# Patient Record
Sex: FEMALE
[De-identification: names, ages, dates, MRNs, and addresses within clinical notes are randomized per-mention and may not be internally consistent; named-entity substitution may affect disease eponyms.]

---

## 2024-04-02 ENCOUNTER — NURSE TRIAGE (OUTPATIENT)
Dept: OTHER | Facility: CLINIC | Age: 29
End: 2024-04-02

## 2024-04-02 NOTE — TELEPHONE ENCOUNTER
Location of patient: [place]    Received call from Amrbosio at [facility] with Red Flag Complaint.    Subjective: Caller states needs new PCP, not feeling well, past few weeks husb and coworkers ill, missed period, took test last night + pregnancy.   Has been having mild symptoms, cramps off and on,achy,nausea     Current Symptoms: mild nausea     Onset: for past few weeks     Associated Symptoms: NA    Pain Severity: Denies     Temperature: Denies    What has been tried:     LMP:  2/2/2024  Pregnant: Yes edc 11/8/24 9 weeks pregnant    Recommended disposition: See in Office Within 2 Weeks    Care advice provided, patient verbalizes understanding; denies any other questions or concerns; instructed to call back for any new or worsening symptoms.    Patient/Caller agrees with recommended disposition; writer provided warm transfer to Iqra at [facility] for appointment scheduling    Attention Provider:  Thank you for allowing me to participate in the care of your patient.  The patient was connected to triage in response to information provided to the [facility].  Please do not respond through this encounter as the response is not directed to a shared pool.       Reason for Disposition   Pregnant    Protocols used: Menstrual Period - Missed or Late-ADULT-[place]

## 2024-04-12 ENCOUNTER — HOSPITAL ENCOUNTER (OUTPATIENT)
Age: 29
Setting detail: SPECIMEN
Discharge: HOME | End: 2024-04-12

## 2024-04-12 ENCOUNTER — OFFICE VISIT (OUTPATIENT)
Dept: FAMILY MEDICINE CLINIC | Age: 29
End: 2024-04-12

## 2024-04-12 VITALS
DIASTOLIC BLOOD PRESSURE: 74 MMHG | HEIGHT: 64 IN | SYSTOLIC BLOOD PRESSURE: 112 MMHG | TEMPERATURE: 97.9 F | RESPIRATION RATE: 16 BRPM | BODY MASS INDEX: 31.41 KG/M2 | WEIGHT: 184 LBS | OXYGEN SATURATION: 99 % | HEART RATE: 96 BPM

## 2024-04-12 DIAGNOSIS — D50.9 IRON DEFICIENCY ANEMIA, UNSPECIFIED IRON DEFICIENCY ANEMIA TYPE: ICD-10-CM

## 2024-04-12 DIAGNOSIS — Z32.00 POSSIBLE PREGNANCY: ICD-10-CM

## 2024-04-12 DIAGNOSIS — Z00.00 PREVENTATIVE HEALTH CARE: Primary | ICD-10-CM

## 2024-04-12 DIAGNOSIS — Z00.00 PREVENTATIVE HEALTH CARE: ICD-10-CM

## 2024-04-12 LAB
ALBUMIN SERPL-MCNC: 4.3 G/DL (ref 3.5–5.2)
ALBUMIN/GLOB SERPL: 1 {RATIO} (ref 1–2.5)
ALP SERPL-CCNC: 80 U/L (ref 35–104)
ALT SERPL-CCNC: 12 U/L (ref 10–35)
ANION GAP SERPL CALCULATED.3IONS-SCNC: 13 MMOL/L (ref 9–16)
AST SERPL-CCNC: 18 U/L (ref 10–35)
BILIRUB SERPL-MCNC: 0.2 MG/DL (ref 0–1.2)
BUN SERPL-MCNC: 9 MG/DL (ref 6–20)
CALCIUM SERPL-MCNC: 9.5 MG/DL (ref 8.6–10.4)
CHLORIDE SERPL-SCNC: 102 MMOL/L (ref 98–107)
CHOLEST SERPL-MCNC: 137 MG/DL (ref 0–199)
CHOLESTEROL/HDL RATIO: 2
CO2 SERPL-SCNC: 21 MMOL/L (ref 20–31)
CREAT SERPL-MCNC: 0.7 MG/DL (ref 0.5–0.9)
EST. AVERAGE GLUCOSE BLD GHB EST-MCNC: 94 MG/DL
GFR SERPL CREATININE-BSD FRML MDRD: >90 ML/MIN/1.73M2
GLUCOSE SERPL-MCNC: 77 MG/DL (ref 74–99)
HBA1C MFR BLD: 4.9 % (ref 4–6)
HDLC SERPL-MCNC: 63 MG/DL
IRON SATN MFR SERPL: 11 % (ref 20–55)
IRON SERPL-MCNC: 39 UG/DL (ref 37–145)
LDLC SERPL CALC-MCNC: 61 MG/DL (ref 0–100)
POTASSIUM SERPL-SCNC: 4.2 MMOL/L (ref 3.7–5.3)
PROT SERPL-MCNC: 7.5 G/DL (ref 6.6–8.7)
SODIUM SERPL-SCNC: 136 MMOL/L (ref 136–145)
TIBC SERPL-MCNC: 361 UG/DL (ref 250–450)
TRIGL SERPL-MCNC: 67 MG/DL
TSH SERPL DL<=0.05 MIU/L-ACNC: 1.43 UIU/ML (ref 0.27–4.2)
UNSATURATED IRON BINDING CAPACITY: 322 UG/DL (ref 112–347)
VLDLC SERPL CALC-MCNC: 13 MG/DL

## 2024-04-12 SDOH — ECONOMIC STABILITY: FOOD INSECURITY: WITHIN THE PAST 12 MONTHS, THE FOOD YOU BOUGHT JUST DIDN'T LAST AND YOU DIDN'T HAVE MONEY TO GET MORE.: NEVER TRUE

## 2024-04-12 SDOH — ECONOMIC STABILITY: HOUSING INSECURITY
IN THE LAST 12 MONTHS, WAS THERE A TIME WHEN YOU DID NOT HAVE A STEADY PLACE TO SLEEP OR SLEPT IN A SHELTER (INCLUDING NOW)?: NO

## 2024-04-12 SDOH — ECONOMIC STABILITY: FOOD INSECURITY: WITHIN THE PAST 12 MONTHS, YOU WORRIED THAT YOUR FOOD WOULD RUN OUT BEFORE YOU GOT MONEY TO BUY MORE.: NEVER TRUE

## 2024-04-12 SDOH — ECONOMIC STABILITY: INCOME INSECURITY: HOW HARD IS IT FOR YOU TO PAY FOR THE VERY BASICS LIKE FOOD, HOUSING, MEDICAL CARE, AND HEATING?: NOT HARD AT ALL

## 2024-04-12 ASSESSMENT — ENCOUNTER SYMPTOMS
COUGH: 0
CHEST TIGHTNESS: 0
SINUS PRESSURE: 0
ABDOMINAL DISTENTION: 0
SORE THROAT: 0
COLOR CHANGE: 0
ABDOMINAL PAIN: 0
SINUS PAIN: 0
NAUSEA: 1
DIARRHEA: 0
CONSTIPATION: 0
RHINORRHEA: 1
SHORTNESS OF BREATH: 0
BACK PAIN: 0

## 2024-04-12 ASSESSMENT — PATIENT HEALTH QUESTIONNAIRE - PHQ9
SUM OF ALL RESPONSES TO PHQ QUESTIONS 1-9: 1
SUM OF ALL RESPONSES TO PHQ QUESTIONS 1-9: 1
SUM OF ALL RESPONSES TO PHQ9 QUESTIONS 1 & 2: 1
SUM OF ALL RESPONSES TO PHQ QUESTIONS 1-9: 1
SUM OF ALL RESPONSES TO PHQ QUESTIONS 1-9: 1
2. FEELING DOWN, DEPRESSED OR HOPELESS: SEVERAL DAYS
1. LITTLE INTEREST OR PLEASURE IN DOING THINGS: NOT AT ALL

## 2024-04-12 NOTE — PROGRESS NOTES
Avelina Bonilla, APRN-CNP  [facility]  [address], [address]  [facility] [address]  Dept: [phone]  Dept Fax: [phone]     Patient ID: Mildred Parekh is a 28 y.o. female.    HPI    Mildred Parekh is a 28 y.o. female New patient who presents to the office today for a first visit and to establish a relationship with a new primary care provider.  Previous PCP: [facility] in  last seen: 2-3 years    Today, the patient needs to establish care and missed period and took pregnancy test April 1st and it was positive.     LMP- feb 2nd     Preventative care, female:  Last PAP: [facility]  Nicotine use: never  Alcohol use: not currently  Drug use: never  Dental exam: 2 years ago   Eye exam: 1 year ago, glasses     Specialists:  none    Previous office notes, labs, imaging and hospital records were reviewed prior to and during encounter.    The patient's past medical, surgical, social, and family history as well as her current medications and allergies were reviewed as documented in today's encounter by ERASMO García.      No current outpatient medications on file prior to visit.     No current facility-administered medications on file prior to visit.       Subjective:     Review of Systems   Constitutional:  Positive for fatigue. Negative for activity change, chills and fever.   HENT:  Positive for congestion and rhinorrhea. Negative for ear pain, sinus pressure, sinus pain and sore throat.    Respiratory:  Negative for cough, chest tightness and shortness of breath.    Cardiovascular:  Negative for chest pain and palpitations.   Gastrointestinal:  Positive for nausea. Negative for abdominal distention, abdominal pain, constipation and diarrhea.   Endocrine: Negative for polydipsia, polyphagia and polyuria.   Genitourinary:  Negative for difficulty urinating, dysuria, frequency and urgency.   Musculoskeletal:  Negative for arthralgias, back pain and

## 2024-04-12 NOTE — PATIENT INSTRUCTIONS
Health Maintenance Recommendations  Exercise   I generally recommend that people of all ages try to get 150 minutes of physical activity per week and it doesn’t matter how this totals up, in other words 30 minutes 5 days per week is as good as 50 minutes 3 days a week and so on.    The level of activity should be such that it is able to get your heart rate up to 100 or more, for example a brisk walk should achieve this rate.   Dietary Recommendations  In terms of diet, I generally recommend trying to eat a healthy well balanced diet full of fruits and vegetables. Avoid carbonated drinks and fruit juices and limit your alcohol use.   Avoid processed foods wherever possible (anything that comes in a can or a box) which can be achieved by sticking to the outside walls of the grocery store where generally you will find fresh fruits/vegetables, meats, dairy, and frozen foods.    Try to avoid starches in the diet where possible and minimize bread, rice, potatoes, and pasta in the diet.  Specifically try to avoid gluten, which even in people that don’t have a timmy allergy, causes havoc in the small intestine and alters absorption of nutrients which can in turn lead to obesity.   Sleep  Try to achieve a regular sleep schedule, waking and laying down at the same time each night.  Most people need 7 hours per night plus or minus 2 hours.    You will know that you’re getting enough because you will wake feeling refreshed and not need to sleep in to catch up on weekends.   Skin Care  Make sure that you don’t neglect your skin.    Play it safe in the sun. Use a sunblock on all of your exposed skin.   The sunblock should be broad spectrum and water resistant.    I do recommend an SPF 30 or higher sun screen any time that you plan to be in the sun for more than 20 minutes, even in the winter or on cloudy days (keep in mind that UV light penetrates clouds and can cause burns even on cloudy days).   Apply 20 to 30 minutes before

## 2024-04-13 LAB
ERYTHROCYTE [DISTWIDTH] IN BLOOD BY AUTOMATED COUNT: 12.7 % (ref 11.8–14.4)
HCT VFR BLD AUTO: 37.9 % (ref 36.3–47.1)
HGB BLD-MCNC: 12.4 G/DL (ref 11.9–15.1)
MCH RBC QN AUTO: 29 PG (ref 25.2–33.5)
MCHC RBC AUTO-ENTMCNC: 32.7 G/DL (ref 28.4–34.8)
MCV RBC AUTO: 88.8 FL (ref 82.6–102.9)
NRBC BLD-RTO: 0 PER 100 WBC
PLATELET # BLD AUTO: 290 K/UL (ref 138–453)
PMV BLD AUTO: 11.6 FL (ref 8.1–13.5)
RBC # BLD AUTO: 4.27 M/UL (ref 3.95–5.11)
WBC OTHER # BLD: 9.3 K/UL (ref 3.5–11.3)

## 2024-04-15 ENCOUNTER — TELEPHONE (OUTPATIENT)
Dept: OBGYN CLINIC | Age: 29
End: 2024-04-15

## 2024-04-15 NOTE — TELEPHONE ENCOUNTER
Mildred Parekh calling reporting a positive pregnancy test.    Mildred Parekh is  a new patient.   If no: previous OBGYN n/a  This is  Mildred Parekh's first pregnancy.    Mildred Parekh last menstrual cycle: 2/2/24  Based on LMP patient is 10weeks     Dose patient have any concerns?   []YES  [x]NO  If yes, please discuss concern with provider to determine if patient needs additional appointment.      Scheduling Instructions and Education  []If patient less than 8 weeks please schedule missed menses (30 mins) between 6-8 weeks. ALSO scheduled USN w/ IPV (w/ NURSE) to follow 2-3 weeks after missed menses   Patient education: Initial missed menses appointment will consist of a pregnancy test and to establish care and review previous births **if applicable**. There will NOT be an ultrasound at this first visit. Please review that the USN and IPV visit will be 2-3 weeks after patient's missed menses. At this appointment dating ultrasound will be complete, prenatal labs ordered, prenatal education completed, pelvic exam if indicated   [x]If patient and GREATER than 8 weeks please schedule USN w/ missed menses (30 min) to follow and IPV (30 minutes) 2-4 weeks after (w/ NURSE)  Patient education: Dating USN will be completed prior to missed menses appointment. Missed menses appointment will be to establish care and past medical/surgical/obgyn history. Prenatal labs will be ordered. IPV that is scheduled 3-4 weeks after missed menses will consist of prenatal education/exam/pelvic if indicated  []If patient if believed to be greater than 12 weeks, please consult provider prior to scheduling   Please document appointments scheduled during phone call   Missed menses date/provider: 4/18/24  USN/IPV date:    Please forward telephone encounter to provider appointments are scheduled with prior to closing telephone encounter.

## 2024-04-18 ENCOUNTER — OFFICE VISIT (OUTPATIENT)
Dept: OBGYN CLINIC | Age: 29
End: 2024-04-18
Payer: COMMERCIAL

## 2024-04-18 ENCOUNTER — HOSPITAL ENCOUNTER (OUTPATIENT)
Age: 29
Setting detail: SPECIMEN
Discharge: HOME | End: 2024-04-18

## 2024-04-18 VITALS
DIASTOLIC BLOOD PRESSURE: 80 MMHG | WEIGHT: 181 LBS | HEIGHT: 64 IN | BODY MASS INDEX: 30.9 KG/M2 | SYSTOLIC BLOOD PRESSURE: 132 MMHG

## 2024-04-18 DIAGNOSIS — N92.6 MISSED MENSES: Primary | ICD-10-CM

## 2024-04-18 DIAGNOSIS — N92.6 MISSED MENSES: ICD-10-CM

## 2024-04-18 LAB
AMPHET UR QL SCN: NEGATIVE
BARBITURATES UR QL SCN: NEGATIVE
BENZODIAZ UR QL: NEGATIVE
CANNABINOIDS UR QL SCN: NEGATIVE
COCAINE UR QL SCN: NEGATIVE
FENTANYL UR QL: NEGATIVE
METHADONE UR QL: NEGATIVE
OPIATES UR QL SCN: NEGATIVE
OXYCODONE UR QL SCN: NEGATIVE
PCP UR QL SCN: NEGATIVE
TEST INFORMATION: NORMAL

## 2024-04-18 PROCEDURE — 99203 OFFICE O/P NEW LOW 30 MIN: CPT | Performed by: ADVANCED PRACTICE MIDWIFE

## 2024-04-18 RX ORDER — PNV NO.118/IRON FUMARATE/FA 29 MG-1 MG
TABLET,CHEWABLE ORAL
COMMUNITY

## 2024-04-18 NOTE — PROGRESS NOTES
[facility] [address]  [address]  [facility] [address]  Dept: [phone]    Missed Menses Intake    Subjective:    Patient Name:Mildred Parekh  Patient Age: 28 y.o.  Date of Visit: 2024    Mildred Parekh arrives for missed menses visit.   Mildred Parekh had a positive pregnancy test 2024  Mildred Parekh does not have concerns.   Planned Pregnancy: No  Partner/FOB name: Zachery-    No LMP recorded. Patient is pregnant., Regular menses: Yes  Estimate gestation age of LMP: 10w6d  Estimated due date based on LMP: 2024  Patient Occupation:     OBGYN History:   Date of Last Pap Smear: few years ago normal   Number of Pregnancies: 1  Number of Live Births: 0  [] Vaginal Birth  []  Birth  [] Vaginal Birth after  delivery ()  [] History of High Risk Pregnancy(ies)  [] History of Birth Complications  [] Hx of infant with NICU stay    Past Medical History  Diabetes: No  Anxiety: Yes  Depression: Yes  Bipolar: No  High Blood Pressure:No  Stroke: No  Seizure: No  Deep Vein Thrombosis: No  Preeclampsia: No  Gestational Diabetes:No  Gestational Hypertension:No  Postpartum Hemorrhage: No  Bleeding Disorder: No  Sexually Transmitted Infections: No  Genital Herpes: No  History of chicken pox/varicella vaccine: Yes  Daily Medications: No  N/a     Past Family History (first degree relative)  Family history of blood clots: No  Family history of diabetes:No  Family history of factor V: No  Family history (mother/sister) of preeclampsia in a previous pregnancy: No    Early 1 Hour Glucose Screening  [x] BMI 30 or greater (if marked, positive screen)  Risk Factors (need more than 1 if BMI less than 30)  [] Physical inactivity   [] First degree relative with diabetes  [] High- risk race or ethnicity (, , ,  American, )  [] Have previously given birth to an

## 2024-04-18 NOTE — PROGRESS NOTES
[facility] [address] [address]  Dept: [phone]    Patient Name: Mildred Parekh  Patient Age: 28 y.o.  Date of Visit: 2024    SUBJECTIVE:    Chief Complaint:  Chief Complaint   Patient presents with    Amenorrhea       HPI:    Mildred  is being seen today for missed menses.  This  is not a planned pregnancy.  She is  accepting at this time.  LMP: Patient's last menstrual period was 2024 (exact date). Sure and definite: Yes    28 day cycle: Yes    She was not on a contraceptive at the time of conception.  Estimated weeks gestation today : 24   Tentative KATHIE: 10w6d    Relationship with FOB: Zachery    Patient reports concerns: no    OB History          1    Para        Term                AB        Living             SAB        IAB        Ectopic        Molar        Multiple        Live Births                  Past Medical History:   Diagnosis Date    Anemia      Current Outpatient Medications   Medication Sig Dispense Refill    Prenatal Vit-Fe Fumarate-FA (PRENATAL VITAMIN) CHEW Take by mouth       No current facility-administered medications for this visit.         OBJECTIVE:    /80 (Site: Right Upper Arm, Position: Sitting, Cuff Size: Medium Adult)   Ht 1.626 m (5' 4\")   Wt 82.1 kg (181 lb)   LMP 2024 (Exact Date)   Breastfeeding No   BMI 31.07 kg/m²     She is complaining today of:   Pain: No  Cramping: No  Bleeding or spotting: No    Nausea: No  Vomiting: No    Breast enlargement/tenderness: No  Fatigue: No  Frequency of urination: No    Previous high risk obstetrical history: n/a  OB History    Para Term  AB Living   1             SAB IAB Ectopic Molar Multiple Live Births                    # Outcome Date GA Lbr Inderjit/2nd Weight Sex Delivery Anes PTL Lv   1                 History was reviewed as documented on [facility]

## 2024-04-19 LAB
CHLAMYDIA DNA UR QL NAA+PROBE: NEGATIVE
N GONORRHOEA DNA UR QL NAA+PROBE: NEGATIVE
SPECIMEN DESCRIPTION: NORMAL

## 2024-04-25 ENCOUNTER — INITIAL PRENATAL (OUTPATIENT)
Dept: OBGYN CLINIC | Age: 29
End: 2024-04-25

## 2024-04-25 ENCOUNTER — HOSPITAL ENCOUNTER (OUTPATIENT)
Age: 29
Setting detail: SPECIMEN
Discharge: HOME | End: 2024-04-25

## 2024-04-25 VITALS
BODY MASS INDEX: 30.93 KG/M2 | SYSTOLIC BLOOD PRESSURE: 112 MMHG | HEART RATE: 86 BPM | DIASTOLIC BLOOD PRESSURE: 80 MMHG | WEIGHT: 180.2 LBS

## 2024-04-25 DIAGNOSIS — Z3A.11 11 WEEKS GESTATION OF PREGNANCY: Primary | ICD-10-CM

## 2024-04-25 DIAGNOSIS — Z3A.11 11 WEEKS GESTATION OF PREGNANCY: ICD-10-CM

## 2024-04-25 PROCEDURE — 0500F INITIAL PRENATAL CARE VISIT: CPT | Performed by: ADVANCED PRACTICE MIDWIFE

## 2024-04-25 RX ORDER — ACETAMINOPHEN 500 MG
500 TABLET ORAL EVERY 6 HOURS PRN
COMMUNITY

## 2024-04-25 RX ORDER — ASPIRIN 81 MG/1
81 TABLET ORAL DAILY
Qty: 90 TABLET | Refills: 1 | Status: SHIPPED | OUTPATIENT
Start: 2024-04-25

## 2024-04-25 RX ORDER — CALCIUM CARBONATE 500 MG/1
1 TABLET, CHEWABLE ORAL DAILY
COMMUNITY

## 2024-04-25 SDOH — ECONOMIC STABILITY: INCOME INSECURITY: IN THE LAST 12 MONTHS, WAS THERE A TIME WHEN YOU WERE NOT ABLE TO PAY THE MORTGAGE OR RENT ON TIME?: NO

## 2024-04-25 SDOH — ECONOMIC STABILITY: FOOD INSECURITY: WITHIN THE PAST 12 MONTHS, YOU WORRIED THAT YOUR FOOD WOULD RUN OUT BEFORE YOU GOT MONEY TO BUY MORE.: NEVER TRUE

## 2024-04-25 SDOH — ECONOMIC STABILITY: HOUSING INSECURITY: IN THE LAST 12 MONTHS, HOW MANY PLACES HAVE YOU LIVED?: 1

## 2024-04-25 SDOH — ECONOMIC STABILITY: FOOD INSECURITY: WITHIN THE PAST 12 MONTHS, THE FOOD YOU BOUGHT JUST DIDN'T LAST AND YOU DIDN'T HAVE MONEY TO GET MORE.: NEVER TRUE

## 2024-04-25 SDOH — ECONOMIC STABILITY: TRANSPORTATION INSECURITY
IN THE PAST 12 MONTHS, HAS LACK OF TRANSPORTATION KEPT YOU FROM MEETINGS, WORK, OR FROM GETTING THINGS NEEDED FOR DAILY LIVING?: NO

## 2024-04-25 SDOH — ECONOMIC STABILITY: TRANSPORTATION INSECURITY
IN THE PAST 12 MONTHS, HAS THE LACK OF TRANSPORTATION KEPT YOU FROM MEDICAL APPOINTMENTS OR FROM GETTING MEDICATIONS?: NO

## 2024-04-25 ASSESSMENT — ANXIETY QUESTIONNAIRES
5. BEING SO RESTLESS THAT IT IS HARD TO SIT STILL: SEVERAL DAYS
7. FEELING AFRAID AS IF SOMETHING AWFUL MIGHT HAPPEN: SEVERAL DAYS
2. NOT BEING ABLE TO STOP OR CONTROL WORRYING: SEVERAL DAYS
IF YOU CHECKED OFF ANY PROBLEMS ON THIS QUESTIONNAIRE, HOW DIFFICULT HAVE THESE PROBLEMS MADE IT FOR YOU TO DO YOUR WORK, TAKE CARE OF THINGS AT HOME, OR GET ALONG WITH OTHER PEOPLE: SOMEWHAT DIFFICULT
4. TROUBLE RELAXING: NOT AT ALL
GAD7 TOTAL SCORE: 5
1. FEELING NERVOUS, ANXIOUS, OR ON EDGE: SEVERAL DAYS
6. BECOMING EASILY ANNOYED OR IRRITABLE: NOT AT ALL
3. WORRYING TOO MUCH ABOUT DIFFERENT THINGS: SEVERAL DAYS

## 2024-04-25 ASSESSMENT — SOCIAL DETERMINANTS OF HEALTH (SDOH)
WITHIN THE LAST YEAR, HAVE YOU BEEN AFRAID OF YOUR PARTNER OR EX-PARTNER?: NO
WITHIN THE LAST YEAR, HAVE TO BEEN RAPED OR FORCED TO HAVE ANY KIND OF SEXUAL ACTIVITY BY YOUR PARTNER OR EX-PARTNER?: NO
WITHIN THE LAST YEAR, HAVE YOU BEEN HUMILIATED OR EMOTIONALLY ABUSED IN OTHER WAYS BY YOUR PARTNER OR EX-PARTNER?: NO
WITHIN THE LAST YEAR, HAVE YOU BEEN KICKED, HIT, SLAPPED, OR OTHERWISE PHYSICALLY HURT BY YOUR PARTNER OR EX-PARTNER?: NO
HOW HARD IS IT FOR YOU TO PAY FOR THE VERY BASICS LIKE FOOD, HOUSING, MEDICAL CARE, AND HEATING?: NOT HARD AT ALL

## 2024-04-25 NOTE — PROGRESS NOTES
Infection History    Blood Type      Patient or partner has Hepatitis C No     Live with Someone with or Exposed to TB? No     History of STD/GC/Chlamydia/HPV/Syphilis? No     Patient or Partner has Hx of Genital Herpes? No     History of Chickenpox Yes     History of MRSA No     Risk of Toxoplasmosis Yes CATS    Risk of CMV No     List of other infections      Rash or Viral Illness Since LMP? No     Additional comments      Patient or partner has Hepatitis B No         Previous Birth History:   Vaginal Birth: no   Birth: no  Any previous birth complications: no  History of hemorrhage during/after birth: no  History of bleeding disorders: Yes-ANEMIC?    High Risk Factor Screening for this Pregnancy:  Age greater than 35 at delivery: No  History of  delivery: no  History of bleeding disorders: No  Objection to receiving blood products: No  History of diabetes (gestational or outside of pregnancy): No, On medications: No  Screening for pregestational diabetes:   BMI greater than 30: Yes. If Yes, need early glucola  BMI greater than 25 ( Americans greater than 23): Yes If Yes, need 1 more risk factor.   Physical inactivity: No  First-degree relative with diabetes: Yes-PATERNAL GPA  High-risk race of ethnicity (eg, , , ,  American, ): No  Previously given birth to an infant weighing 7lks60jc (4000g) or more: No  History of hypertension: No  HDL < 35mg/dL and/or a triglyceride level greater than 250 mg/dL: NA  History of polycystic ovarian syndrome: No  A1c greater than or equal to 5.7%: N/A    Assessment/Plan:    No pregnancy checklist tasks were completed during this visit, and no tasks are pending completion.       Pregnancy at 11W6D   She was counseled on office policies. She was educated about the anticipated course of prenatal care and routine prenatal labs.   Patient has consented to HIV testing and urine drug screen: Yes  Initial

## 2024-04-26 ENCOUNTER — HOSPITAL ENCOUNTER (OUTPATIENT)
Age: 29
Setting detail: SPECIMEN
Discharge: HOME | End: 2024-04-26

## 2024-04-26 DIAGNOSIS — Z3A.11 11 WEEKS GESTATION OF PREGNANCY: ICD-10-CM

## 2024-04-26 LAB
ABO + RH BLD: NORMAL
BASOPHILS # BLD: 0.1 K/UL (ref 0–0.2)
BASOPHILS NFR BLD: 1 % (ref 0–2)
BLOOD GROUP ANTIBODIES SERPL: NEGATIVE
EOSINOPHIL # BLD: 0.06 K/UL (ref 0–0.44)
EOSINOPHILS RELATIVE PERCENT: 1 % (ref 1–4)
ERYTHROCYTE [DISTWIDTH] IN BLOOD BY AUTOMATED COUNT: 12.2 % (ref 11.8–14.4)
HBV SURFACE AG SERPL QL IA: NONREACTIVE
HCT VFR BLD AUTO: 35.9 % (ref 36.3–47.1)
HCV AB SERPL QL IA: NONREACTIVE
HGB BLD-MCNC: 11.9 G/DL (ref 11.9–15.1)
HIV 1+2 AB+HIV1 P24 AG SERPL QL IA: NONREACTIVE
IMM GRANULOCYTES # BLD AUTO: 0.07 K/UL (ref 0–0.3)
IMM GRANULOCYTES NFR BLD: 1 %
LYMPHOCYTES NFR BLD: 2.35 K/UL (ref 1.1–3.7)
LYMPHOCYTES RELATIVE PERCENT: 18 % (ref 24–43)
MCH RBC QN AUTO: 28.7 PG (ref 25.2–33.5)
MCHC RBC AUTO-ENTMCNC: 33.1 G/DL (ref 28.4–34.8)
MCV RBC AUTO: 86.7 FL (ref 82.6–102.9)
MICROORGANISM SPEC CULT: NORMAL
MONOCYTES NFR BLD: 0.74 K/UL (ref 0.1–1.2)
MONOCYTES NFR BLD: 6 % (ref 3–12)
NEUTROPHILS NFR BLD: 73 % (ref 36–65)
NEUTS SEG NFR BLD: 9.8 K/UL (ref 1.5–8.1)
NRBC BLD-RTO: 0 PER 100 WBC
PLATELET # BLD AUTO: 286 K/UL (ref 138–453)
PMV BLD AUTO: 11.2 FL (ref 8.1–13.5)
RBC # BLD AUTO: 4.14 M/UL (ref 3.95–5.11)
RUBV IGG SERPL QL IA: 168 IU/ML
SPECIMEN DESCRIPTION: NORMAL
T PALLIDUM AB SER QL IA: NONREACTIVE
WBC OTHER # BLD: 13.1 K/UL (ref 3.5–11.3)

## 2024-04-29 LAB — VZV IGG SER QL IA: 1.63

## 2024-05-07 ENCOUNTER — HOSPITAL ENCOUNTER (OUTPATIENT)
Age: 29
Setting detail: SPECIMEN
Discharge: HOME | End: 2024-05-07

## 2024-05-07 DIAGNOSIS — Z3A.11 11 WEEKS GESTATION OF PREGNANCY: ICD-10-CM

## 2024-05-07 LAB
GLUCOSE 1H P 50 G GLC PO SERPL-MCNC: 60 MG/DL (ref 70–135)
GLUCOSE ADMINISTRATION: ABNORMAL

## 2024-05-23 ENCOUNTER — ROUTINE PRENATAL (OUTPATIENT)
Dept: OBGYN CLINIC | Age: 29
End: 2024-05-23

## 2024-05-23 VITALS
BODY MASS INDEX: 30.39 KG/M2 | SYSTOLIC BLOOD PRESSURE: 134 MMHG | WEIGHT: 178 LBS | DIASTOLIC BLOOD PRESSURE: 80 MMHG | HEIGHT: 64 IN

## 2024-05-23 DIAGNOSIS — O99.212 OBESITY AFFECTING PREGNANCY IN SECOND TRIMESTER, UNSPECIFIED OBESITY TYPE: ICD-10-CM

## 2024-05-23 DIAGNOSIS — O09.92 HRP (HIGH RISK PREGNANCY), SECOND TRIMESTER: Primary | ICD-10-CM

## 2024-05-23 DIAGNOSIS — Z3A.15 15 WEEKS GESTATION OF PREGNANCY: ICD-10-CM

## 2024-05-23 PROCEDURE — 0502F SUBSEQUENT PRENATAL CARE: CPT | Performed by: ADVANCED PRACTICE MIDWIFE

## 2024-05-23 NOTE — PROGRESS NOTES
SUBJECTIVE:    Mildred is here for her return OB visit. denies concerns today.   She reports  feeling fetal movement.  She denies vaginal bleeding.  She denies vaginal discharge.  She denies leaking of fluid.  She denies uterine cramping.  She denies  nausea and/or vomiting.    OBJECTIVE:  Blood pressure 134/80, height 1.626 m (5' 4\"), weight 80.7 kg (178 lb), last menstrual period 02/02/2024, not currently breastfeeding.    Total weight gain: 1.361 kg (3 lb)      ASSESSMENT/PLAN:  IUP @ 15+6 weeks  S=D    High Risk Pregnancy  Due date is based on LMP and confirmed with 10w6d early dating ultrasound  Patient's prenatal labs are completed.  Patient's blood type O positive and rhogam is not indicated in the pregnancy.   Early glucola indicated: yes  Completed: Yes  Results: 60 Pass  Plan: repeat 28 weeks  Patient Accepted  genetic screening.   Accepted and cell free DNA testingand test(s) are low risk trisomy 21/18/16 (NIPT)  Anatomy scan scheduled 6/25/24   Total weight gain in pregnancy reviewed: Yes  Fetal movement was reviewed.      2. 15 weeks gestation of pregnancy  - Reviewed warning signs     3. Obesity affecting pregnancy in second trimester, unspecified obesity type        She was counseled regarding all of the above:    Return in about 4 weeks (around 6/20/2024) for Return OB.    The patient, Mildred Parekh was seen for 25 minutes were spent on this encounter on the date of service by the provider.         Electronically Signed By: MIKA Ozuna CNM

## 2024-06-25 ENCOUNTER — ROUTINE PRENATAL (OUTPATIENT)
Dept: PERINATAL CARE | Age: 29
End: 2024-06-25
Payer: COMMERCIAL

## 2024-06-25 VITALS
HEART RATE: 95 BPM | BODY MASS INDEX: 31.07 KG/M2 | WEIGHT: 182 LBS | HEIGHT: 64 IN | SYSTOLIC BLOOD PRESSURE: 130 MMHG | RESPIRATION RATE: 16 BRPM | DIASTOLIC BLOOD PRESSURE: 76 MMHG | TEMPERATURE: 98.2 F

## 2024-06-25 DIAGNOSIS — Z36.86 ENCOUNTER FOR SCREENING FOR RISK OF PRE-TERM LABOR: ICD-10-CM

## 2024-06-25 DIAGNOSIS — O35.10X0 FAMILY HISTORIC RISK OF CHROMOSOMAL ABNORMALITY IN FETUS, ANTEPARTUM, SINGLE OR UNSPECIFIED FETUS: ICD-10-CM

## 2024-06-25 DIAGNOSIS — O35.EXX0 RENAL AGENESIS OF FETUS AFFECTING ANTEPARTUM CARE OF MOTHER, SINGLE OR UNSPECIFIED FETUS: Primary | ICD-10-CM

## 2024-06-25 DIAGNOSIS — Q60.0 KIDNEY CONGENITALLY ABSENT, RIGHT: ICD-10-CM

## 2024-06-25 DIAGNOSIS — O99.212 OBESITY AFFECTING PREGNANCY IN SECOND TRIMESTER, UNSPECIFIED OBESITY TYPE: ICD-10-CM

## 2024-06-25 DIAGNOSIS — Z3A.20 20 WEEKS GESTATION OF PREGNANCY: ICD-10-CM

## 2024-06-25 PROCEDURE — 76817 TRANSVAGINAL US OBSTETRIC: CPT | Performed by: OBSTETRICS & GYNECOLOGY

## 2024-06-25 PROCEDURE — 99204 OFFICE O/P NEW MOD 45 MIN: CPT | Performed by: OBSTETRICS & GYNECOLOGY

## 2024-06-25 PROCEDURE — 76811 OB US DETAILED SNGL FETUS: CPT | Performed by: OBSTETRICS & GYNECOLOGY

## 2024-07-10 ENCOUNTER — ROUTINE PRENATAL (OUTPATIENT)
Dept: OBGYN CLINIC | Age: 29
End: 2024-07-10

## 2024-07-10 VITALS
HEART RATE: 93 BPM | BODY MASS INDEX: 31.34 KG/M2 | WEIGHT: 183.6 LBS | HEIGHT: 64 IN | SYSTOLIC BLOOD PRESSURE: 120 MMHG | OXYGEN SATURATION: 99 % | DIASTOLIC BLOOD PRESSURE: 80 MMHG

## 2024-07-10 DIAGNOSIS — O09.93 HIGH-RISK PREGNANCY IN THIRD TRIMESTER: ICD-10-CM

## 2024-07-10 DIAGNOSIS — O35.EXX1: ICD-10-CM

## 2024-07-10 DIAGNOSIS — Z3A.22 22 WEEKS GESTATION OF PREGNANCY: Primary | ICD-10-CM

## 2024-07-10 PROCEDURE — 99024 POSTOP FOLLOW-UP VISIT: CPT | Performed by: STUDENT IN AN ORGANIZED HEALTH CARE EDUCATION/TRAINING PROGRAM

## 2024-07-10 NOTE — PROGRESS NOTES
collected 7/10/2024 NEGATIVE 7/10/2024 Michelle Mahmood DO   [x] Prenatal Labs completed     Genetic Screening:  [x]Accepted NIPS vs FTS : NIPT W/GENDER  [x]Completed  [x] Low risk     Carrier Screening:  [x] Declined    Baby aspirin screen:  [x]Positive (BMI + parity)  []Negative    Early 1 hour GTT:  [x]Yes (BMI) 60 pass   [] No    AFP:   []Ordered  []Completed    Anatomy scan:  [x]Referral Sent 7/10/2024 Michelle Mahmood DO   [x]Scheduled 6/25/24  [x]Completed: Anterior placenta, normal cord insertion, 3VC, Female. ABSENT RIGHT FETAL KIDNEY AND ABSENT RENAL ARTERY. Return 4 wks for echo and repeat scan  [x] Follow up     Fetal Echo:   [x] Yes  [] No    High Risk Factors:    Right Renal Agenesis (Fetal)  - NIPT low risk  - fetal echo scheduled   - M follow up and referral to pediatric urology     Obesity   - pregravid BMI 31  - early 1 hour: passed  - baby ASA  Fm Hx T21  - sister has down syndrome     [x]Placed on High Risk List    Fetal Surveillance:  [] Yes  [x] No    Covid Vaccine:DECLINED   Tdap:  []Given **  [] Declined **  Rhogam:  []Given   [x] Not indicated     1hr GTT:  [] Results **  3hr GTT:    []Breast Pump Order Signed/Sent    36 weeks US:  []Completed     GBS:  [] Positive   [] Negative from **    Apts with :  [] Date: ** Gestational Age: **  [] Date: ** Gestational Age: **    Apts with :  [x] Date: 7/10/24 Gestational Age: 22w5d  [] Date: ** Gestational Age: **    Depression/Anxiety screening (date/results/sign off)  1st trimester 7/10/2024 Michelle Mahmood DO   *EPDS score:10 SSRS ALL NO  *GAD7 score:5  *MOOD score:3    2nd trimester  *EPDS score:  *GAD7 score:    3rd trimester  *EPDS score:  *GAD7 score:        The problem list reflects the active issues addressed during today's visit  Patient Active Problem List    Diagnosis Date Noted    Renal agenesis of fetus affecting antepartum care of mother, fetus 1 07/10/2024     Return in about 4 weeks (around 8/7/2024) for

## 2024-07-23 ENCOUNTER — ROUTINE PRENATAL (OUTPATIENT)
Dept: PERINATAL CARE | Age: 29
End: 2024-07-23
Payer: COMMERCIAL

## 2024-07-23 VITALS
HEART RATE: 92 BPM | BODY MASS INDEX: 31.76 KG/M2 | SYSTOLIC BLOOD PRESSURE: 124 MMHG | DIASTOLIC BLOOD PRESSURE: 68 MMHG | WEIGHT: 186 LBS | RESPIRATION RATE: 16 BRPM | TEMPERATURE: 98.1 F | HEIGHT: 64 IN

## 2024-07-23 DIAGNOSIS — Z36.4 ULTRASOUND FOR ANTENATAL SCREENING FOR FETAL GROWTH RESTRICTION: ICD-10-CM

## 2024-07-23 DIAGNOSIS — Z3A.24 24 WEEKS GESTATION OF PREGNANCY: ICD-10-CM

## 2024-07-23 DIAGNOSIS — O99.212 OBESITY AFFECTING PREGNANCY IN SECOND TRIMESTER, UNSPECIFIED OBESITY TYPE: ICD-10-CM

## 2024-07-23 DIAGNOSIS — O35.EXX0 RENAL AGENESIS OF FETUS AFFECTING ANTEPARTUM CARE OF MOTHER, SINGLE OR UNSPECIFIED FETUS: Primary | ICD-10-CM

## 2024-07-23 DIAGNOSIS — O35.10X0 FAMILY HISTORIC RISK OF CHROMOSOMAL ABNORMALITY IN FETUS, ANTEPARTUM, SINGLE OR UNSPECIFIED FETUS: ICD-10-CM

## 2024-07-23 PROCEDURE — 76827 ECHO EXAM OF FETAL HEART: CPT | Performed by: OBSTETRICS & GYNECOLOGY

## 2024-07-23 PROCEDURE — 93325 DOPPLER ECHO COLOR FLOW MAPG: CPT | Performed by: OBSTETRICS & GYNECOLOGY

## 2024-07-23 PROCEDURE — 76816 OB US FOLLOW-UP PER FETUS: CPT | Performed by: OBSTETRICS & GYNECOLOGY

## 2024-07-23 PROCEDURE — 99999 PR OFFICE/OUTPT VISIT,PROCEDURE ONLY: CPT | Performed by: OBSTETRICS & GYNECOLOGY

## 2024-07-23 PROCEDURE — 76825 ECHO EXAM OF FETAL HEART: CPT | Performed by: OBSTETRICS & GYNECOLOGY

## 2024-08-05 ENCOUNTER — ROUTINE PRENATAL (OUTPATIENT)
Dept: OBGYN CLINIC | Age: 29
End: 2024-08-05

## 2024-08-05 VITALS — SYSTOLIC BLOOD PRESSURE: 112 MMHG | DIASTOLIC BLOOD PRESSURE: 60 MMHG | WEIGHT: 181 LBS | BODY MASS INDEX: 31.07 KG/M2

## 2024-08-05 DIAGNOSIS — Z3A.26 26 WEEKS GESTATION OF PREGNANCY: Primary | ICD-10-CM

## 2024-08-05 PROCEDURE — 0502F SUBSEQUENT PRENATAL CARE: CPT | Performed by: OBSTETRICS & GYNECOLOGY

## 2024-08-05 NOTE — PROGRESS NOTES
Mildred Parekh is a 29 y.o. female 26w3d        OB History    Para Term  AB Living   1             SAB IAB Ectopic Molar Multiple Live Births                    # Outcome Date GA Lbr Inderjit/2nd Weight Sex Delivery Anes PTL Lv   1 Current                Vitals  BP: 112/60  Weight - Scale: 82.1 kg (181 lb)  Fundal Height (cm): 26 cm  Fetal HR: 149  Movement: Present      The patient was seen and evaluated. There was positive fetal movements. No contractions or leakage of fluid. Signs and symptoms of  labor as well as labor were reviewed. The S/S of Pre-Eclampsia were reviewed with the patient in detail. She is to report any of these if they occur. She currently denies any of these.    The patient had her 28 week labs ordered.  No visits with results within 5 Week(s) from this visit.   Latest known visit with results is:   Hospital Outpatient Visit on 2024   Component Date Value Ref Range Status    GLU ADMN 2024 Glucola   Final    Glucose tolerance screen 50g 2024 60 (L)  70 - 135 mg/dL Final   ]    Insurance: BCBS    IPV bag/mug given:7/10/2024 Michelle Mahmood DO   Genetic Information given/reviewed:  1st trimester education packet given:7/10/2024 Michelle Mahmood DO   2nd trimester education packet given:  3rd trimester education packet given:      FOB Name: Zachery-   KNOWS GENDER - ITS A GIRL (Denise Bass)    Last Pap Smear: records request sent to [facility] 7/10/2024 Michelle Mahmood DO  [x] Urine collected for culture 7/10/2024 Michelle Mahmood DO    [x] Negative date 24   [] Positive date   [x] UDS collected 7/10/2024 NEGATIVE 7/10/2024 Michelle Mahmood DO   [x] Gc/ct collected 7/10/2024 NEGATIVE 7/10/2024 Michelle Mahmood DO   [x] Prenatal Labs completed     Genetic Screening:  [x]Accepted NIPS vs FTS : NIPT W/GENDER  [x]Completed  [x] Low risk     Carrier Screening:  [x] Declined    Baby aspirin screen:  [x]Positive (BMI

## 2024-08-20 ENCOUNTER — HOSPITAL ENCOUNTER (OUTPATIENT)
Age: 29
Setting detail: SPECIMEN
Discharge: HOME | End: 2024-08-20

## 2024-08-20 ENCOUNTER — ROUTINE PRENATAL (OUTPATIENT)
Dept: PERINATAL CARE | Age: 29
End: 2024-08-20
Payer: COMMERCIAL

## 2024-08-20 VITALS
HEIGHT: 64 IN | HEART RATE: 90 BPM | RESPIRATION RATE: 16 BRPM | WEIGHT: 187 LBS | SYSTOLIC BLOOD PRESSURE: 127 MMHG | DIASTOLIC BLOOD PRESSURE: 76 MMHG | BODY MASS INDEX: 31.92 KG/M2 | TEMPERATURE: 98.1 F

## 2024-08-20 DIAGNOSIS — O35.10X0 FAMILY HISTORIC RISK OF CHROMOSOMAL ABNORMALITY IN FETUS, ANTEPARTUM, SINGLE OR UNSPECIFIED FETUS: ICD-10-CM

## 2024-08-20 DIAGNOSIS — O35.EXX0 RENAL AGENESIS OF FETUS AFFECTING ANTEPARTUM CARE OF MOTHER, SINGLE OR UNSPECIFIED FETUS: Primary | ICD-10-CM

## 2024-08-20 DIAGNOSIS — O99.213 OBESITY AFFECTING PREGNANCY IN THIRD TRIMESTER, UNSPECIFIED OBESITY TYPE: ICD-10-CM

## 2024-08-20 DIAGNOSIS — Z36.3 ENCOUNTER FOR ROUTINE SCREENING FOR MALFORMATION USING ULTRASONICS: ICD-10-CM

## 2024-08-20 DIAGNOSIS — Z3A.22 22 WEEKS GESTATION OF PREGNANCY: ICD-10-CM

## 2024-08-20 DIAGNOSIS — Z3A.28 28 WEEKS GESTATION OF PREGNANCY: ICD-10-CM

## 2024-08-20 LAB
ERYTHROCYTE [DISTWIDTH] IN BLOOD BY AUTOMATED COUNT: 13.2 % (ref 11.8–14.4)
GLUCOSE 1H P 50 G GLC PO SERPL-MCNC: 88 MG/DL (ref 70–135)
GLUCOSE ADMINISTRATION: NORMAL
HCT VFR BLD AUTO: 33.5 % (ref 36.3–47.1)
HGB BLD-MCNC: 10.4 G/DL (ref 11.9–15.1)
MCH RBC QN AUTO: 29.2 PG (ref 25.2–33.5)
MCHC RBC AUTO-ENTMCNC: 31 G/DL (ref 28.4–34.8)
MCV RBC AUTO: 94.1 FL (ref 82.6–102.9)
NRBC BLD-RTO: 0 PER 100 WBC
PLATELET # BLD AUTO: 300 K/UL (ref 138–453)
PMV BLD AUTO: 10.7 FL (ref 8.1–13.5)
RBC # BLD AUTO: 3.56 M/UL (ref 3.95–5.11)
WBC OTHER # BLD: 13.1 K/UL (ref 3.5–11.3)

## 2024-08-20 PROCEDURE — 99999 PR OFFICE/OUTPT VISIT,PROCEDURE ONLY: CPT | Performed by: OBSTETRICS & GYNECOLOGY

## 2024-08-20 PROCEDURE — 76805 OB US >/= 14 WKS SNGL FETUS: CPT | Performed by: OBSTETRICS & GYNECOLOGY

## 2024-08-20 PROCEDURE — 76819 FETAL BIOPHYS PROFIL W/O NST: CPT | Performed by: OBSTETRICS & GYNECOLOGY

## 2024-08-21 LAB
MICROORGANISM SPEC CULT: NORMAL
SERVICE CMNT-IMP: NORMAL
SPECIMEN DESCRIPTION: NORMAL

## 2024-08-23 DIAGNOSIS — O99.013 ANEMIA AFFECTING PREGNANCY IN THIRD TRIMESTER: Primary | ICD-10-CM

## 2024-08-26 ENCOUNTER — TELEPHONE (OUTPATIENT)
Dept: OBGYN CLINIC | Age: 29
End: 2024-08-26

## 2024-08-26 NOTE — TELEPHONE ENCOUNTER
Patient stated her prenatal vitamins have 104% iron, and she was informed if mild anemia based on CBC- was told iron supplements were sent in to pharmacy. Asking if ok to take with addition to her prenatal vitamins, or if she would even need them?

## 2024-08-27 NOTE — TELEPHONE ENCOUNTER
It is okay to take the iron supplementation as well every other day. Would recommend taking with vitamin C for better absorption. Her hemoglobin was barely below the cutoff for anemia in pregnancy. She can continue with prenatal only if she would like and we can repeat CBC in a few weeks. Either is fine.    Dr. Mahmood

## 2024-09-04 ENCOUNTER — ROUTINE PRENATAL (OUTPATIENT)
Dept: OBGYN CLINIC | Age: 29
End: 2024-09-04

## 2024-09-04 VITALS
DIASTOLIC BLOOD PRESSURE: 70 MMHG | BODY MASS INDEX: 31.95 KG/M2 | WEIGHT: 186.13 LBS | SYSTOLIC BLOOD PRESSURE: 120 MMHG

## 2024-09-04 DIAGNOSIS — Z3A.30 30 WEEKS GESTATION OF PREGNANCY: Primary | ICD-10-CM

## 2024-09-04 PROCEDURE — 0502F SUBSEQUENT PRENATAL CARE: CPT | Performed by: OBSTETRICS & GYNECOLOGY

## 2024-09-16 ENCOUNTER — ROUTINE PRENATAL (OUTPATIENT)
Dept: OBGYN CLINIC | Age: 29
End: 2024-09-16

## 2024-09-16 VITALS
BODY MASS INDEX: 33.02 KG/M2 | WEIGHT: 192.38 LBS | SYSTOLIC BLOOD PRESSURE: 114 MMHG | DIASTOLIC BLOOD PRESSURE: 64 MMHG

## 2024-09-16 DIAGNOSIS — Z3A.32 32 WEEKS GESTATION OF PREGNANCY: Primary | ICD-10-CM

## 2024-09-16 PROCEDURE — 0502F SUBSEQUENT PRENATAL CARE: CPT | Performed by: OBSTETRICS & GYNECOLOGY

## 2024-09-17 ENCOUNTER — ROUTINE PRENATAL (OUTPATIENT)
Dept: PERINATAL CARE | Age: 29
End: 2024-09-17
Payer: COMMERCIAL

## 2024-09-17 VITALS
HEIGHT: 64 IN | SYSTOLIC BLOOD PRESSURE: 120 MMHG | WEIGHT: 192 LBS | TEMPERATURE: 98.2 F | RESPIRATION RATE: 16 BRPM | HEART RATE: 88 BPM | DIASTOLIC BLOOD PRESSURE: 72 MMHG | BODY MASS INDEX: 32.78 KG/M2

## 2024-09-17 DIAGNOSIS — O35.10X0 FAMILY HISTORIC RISK OF CHROMOSOMAL ABNORMALITY IN FETUS, ANTEPARTUM, SINGLE OR UNSPECIFIED FETUS: ICD-10-CM

## 2024-09-17 DIAGNOSIS — O35.EXX0 RENAL AGENESIS OF FETUS AFFECTING ANTEPARTUM CARE OF MOTHER, SINGLE OR UNSPECIFIED FETUS: Primary | ICD-10-CM

## 2024-09-17 DIAGNOSIS — O99.213 OBESITY AFFECTING PREGNANCY IN THIRD TRIMESTER, UNSPECIFIED OBESITY TYPE: ICD-10-CM

## 2024-09-17 DIAGNOSIS — Z3A.32 32 WEEKS GESTATION OF PREGNANCY: ICD-10-CM

## 2024-09-17 DIAGNOSIS — Z36.4 ULTRASOUND FOR ANTENATAL SCREENING FOR FETAL GROWTH RESTRICTION: ICD-10-CM

## 2024-09-17 DIAGNOSIS — O35.9XX0 FETAL ABNORMALITY AFFECTING MANAGEMENT OF MOTHER, SINGLE OR UNSPECIFIED FETUS: ICD-10-CM

## 2024-09-17 PROCEDURE — 76816 OB US FOLLOW-UP PER FETUS: CPT | Performed by: OBSTETRICS & GYNECOLOGY

## 2024-09-17 PROCEDURE — 76819 FETAL BIOPHYS PROFIL W/O NST: CPT | Performed by: OBSTETRICS & GYNECOLOGY

## 2024-09-17 PROCEDURE — 99999 PR OFFICE/OUTPT VISIT,PROCEDURE ONLY: CPT | Performed by: OBSTETRICS & GYNECOLOGY

## 2024-09-18 ENCOUNTER — TELEPHONE (OUTPATIENT)
Dept: OBGYN | Age: 29
End: 2024-09-18

## 2024-09-18 DIAGNOSIS — O35.9XX0 KNOWN FETAL ANOMALY, ANTEPARTUM, SINGLE OR UNSPECIFIED FETUS: Primary | ICD-10-CM

## 2024-09-18 DIAGNOSIS — Z3A.32 32 WEEKS GESTATION OF PREGNANCY: Primary | ICD-10-CM

## 2024-10-03 ENCOUNTER — ROUTINE PRENATAL (OUTPATIENT)
Dept: OBGYN CLINIC | Age: 29
End: 2024-10-03

## 2024-10-03 VITALS — SYSTOLIC BLOOD PRESSURE: 114 MMHG | BODY MASS INDEX: 33.13 KG/M2 | DIASTOLIC BLOOD PRESSURE: 66 MMHG | WEIGHT: 193 LBS

## 2024-10-03 DIAGNOSIS — O35.EXX1: ICD-10-CM

## 2024-10-03 DIAGNOSIS — O09.93 HIGH-RISK PREGNANCY IN THIRD TRIMESTER: ICD-10-CM

## 2024-10-03 DIAGNOSIS — Z23 NEED FOR TDAP VACCINATION: Primary | ICD-10-CM

## 2024-10-03 DIAGNOSIS — Z23 FLU VACCINE NEED: ICD-10-CM

## 2024-10-03 DIAGNOSIS — Z3A.35 35 WEEKS GESTATION OF PREGNANCY: ICD-10-CM

## 2024-10-03 NOTE — PROGRESS NOTES
Pt accept to receive TDAP and flu vaccine. Consent obtained. Flu 0.5mL given IM in her right deltoid. TDAP 0.5mL given IM in her left deltoid. Pt tolerated well.    Documented in immunizations tab.

## 2024-10-03 NOTE — PROGRESS NOTES
Prenatal Visit    Mildred Parekh is a 29 y.o. female  at 34w6d    The patient was seen and evaluated. She has no complaints. There was positive fetal movements. She denies contractions, vaginal bleeding and leakage of fluid. Signs and symptoms of  labor as well as labor were reviewed. The S/S of Pre-Eclampsia were reviewed with the patient in detail. She is to report any of these if they occur. She currently denies any of these.    Discussed resident involvement in triage and labor and delivery process. Discussed call group. All questions answered. Patient verbalized understanding and agreement.     Vitals:  /66   Wt 87.5 kg (193 lb)   LMP 2024 (Exact Date)   BMI 33.13 kg/m²       Assessment & Plan:  Mildred Parekh is a 29 y.o. female  at 34w6d   - 28 week labs completed   - Influenza and Covid vaccinations recommended per ACOG: R/B/A discussed with increased risk of both maternal and fetal morbidity and mortality in unvaccinated pregnant patients.    - TDAP and RSV vaccinations recommended per ACOG. R/B/A discussed. She understands must received RSV vaccine at pharmacy.   - Continue prenatal vitamin   - good fetal growth and amniotic fluid   - discussed  fetal renal US   - no changes for delivery planning   - discussed risk reducing IOL. Patient to decide    - tdap and flu today      Insurance: BCBS      FOB Name: Zachery-   KNOWS GENDER - ITS A GIRL (Denise Bass)    Last Pap Smear: records request sent to [facility] 7/10/2024 Michelle Mahmood DO  [x] Urine collected for culture 7/10/2024 Michelle Mahmood DO    [x] Negative date 24   [] Positive date   [x] UDS collected 7/10/2024 NEGATIVE 7/10/2024 Michelle Mahmood DO   [x] Gc/ct collected 7/10/2024 NEGATIVE 7/10/2024 Michelle Mahmood DO   [x] Prenatal Labs completed     Genetic Screening:  [x]Accepted NIPS vs FTS : NIPT W/GENDER  [x]Completed  [x] Low risk     Carrier Screening:  [x]

## 2024-10-14 ENCOUNTER — TELEPHONE (OUTPATIENT)
Dept: OBGYN CLINIC | Age: 29
End: 2024-10-14

## 2024-10-15 ENCOUNTER — TELEPHONE (OUTPATIENT)
Dept: PERINATAL CARE | Age: 29
End: 2024-10-15

## 2024-10-15 ENCOUNTER — ROUTINE PRENATAL (OUTPATIENT)
Dept: PERINATAL CARE | Age: 29
End: 2024-10-15
Payer: COMMERCIAL

## 2024-10-15 VITALS
WEIGHT: 197 LBS | BODY MASS INDEX: 33.63 KG/M2 | SYSTOLIC BLOOD PRESSURE: 128 MMHG | TEMPERATURE: 97.3 F | DIASTOLIC BLOOD PRESSURE: 74 MMHG | HEIGHT: 64 IN | HEART RATE: 88 BPM | RESPIRATION RATE: 16 BRPM

## 2024-10-15 DIAGNOSIS — O35.9XX0 FETAL ABNORMALITY AFFECTING MANAGEMENT OF MOTHER, SINGLE OR UNSPECIFIED FETUS: ICD-10-CM

## 2024-10-15 DIAGNOSIS — Z36.3 ENCOUNTER FOR ROUTINE SCREENING FOR MALFORMATION USING ULTRASONICS: ICD-10-CM

## 2024-10-15 DIAGNOSIS — O99.213 OBESITY AFFECTING PREGNANCY IN THIRD TRIMESTER, UNSPECIFIED OBESITY TYPE: ICD-10-CM

## 2024-10-15 DIAGNOSIS — O35.EXX0 RENAL AGENESIS OF FETUS AFFECTING ANTEPARTUM CARE OF MOTHER, SINGLE OR UNSPECIFIED FETUS: Primary | ICD-10-CM

## 2024-10-15 DIAGNOSIS — O35.10X0 FAMILY HISTORIC RISK OF CHROMOSOMAL ABNORMALITY IN FETUS, ANTEPARTUM, SINGLE OR UNSPECIFIED FETUS: ICD-10-CM

## 2024-10-15 DIAGNOSIS — Z3A.36 36 WEEKS GESTATION OF PREGNANCY: ICD-10-CM

## 2024-10-15 PROCEDURE — 99999 PR OFFICE/OUTPT VISIT,PROCEDURE ONLY: CPT | Performed by: OBSTETRICS & GYNECOLOGY

## 2024-10-15 PROCEDURE — 76819 FETAL BIOPHYS PROFIL W/O NST: CPT | Performed by: OBSTETRICS & GYNECOLOGY

## 2024-10-15 PROCEDURE — 76805 OB US >/= 14 WKS SNGL FETUS: CPT | Performed by: OBSTETRICS & GYNECOLOGY

## 2024-10-15 NOTE — TELEPHONE ENCOUNTER
Message left on Mildred's voicemail to call & schedule a consultation as previously requested by her. Mildred left today's appointment without stopping at the checkout desk to schedule.

## 2024-10-16 ENCOUNTER — ROUTINE PRENATAL (OUTPATIENT)
Dept: OBGYN CLINIC | Age: 29
End: 2024-10-16

## 2024-10-16 ENCOUNTER — HOSPITAL ENCOUNTER (OUTPATIENT)
Age: 29
Setting detail: SPECIMEN
Discharge: HOME | End: 2024-10-16

## 2024-10-16 VITALS — WEIGHT: 199 LBS | SYSTOLIC BLOOD PRESSURE: 114 MMHG | DIASTOLIC BLOOD PRESSURE: 74 MMHG | BODY MASS INDEX: 34.16 KG/M2

## 2024-10-16 DIAGNOSIS — O09.93 HIGH-RISK PREGNANCY IN THIRD TRIMESTER: Primary | ICD-10-CM

## 2024-10-16 DIAGNOSIS — Z3A.36 36 WEEKS GESTATION OF PREGNANCY: ICD-10-CM

## 2024-10-16 PROCEDURE — 0502F SUBSEQUENT PRENATAL CARE: CPT | Performed by: OBSTETRICS & GYNECOLOGY

## 2024-10-16 NOTE — PROGRESS NOTES
Mildred Parekh is a 29 y.o. female 36w5d        OB History    Para Term  AB Living   1             SAB IAB Ectopic Molar Multiple Live Births                    # Outcome Date GA Lbr Inderjit/2nd Weight Sex Type Anes PTL Lv   1 Current                  Vitals  BP: 114/74  Weight - Scale: 90.3 kg (199 lb)      The patient was seen and evaluated. There was positive fetal movements. No contractions or leakage of fluid. Signs and symptoms of labor were reviewed.  The S/S of Pre-Eclampsia were reviewed with the patient in detail. She is to report any of these if they occur. She currently denies any of these.    The patient was instructed on fetal kick counts and was given a kick sheet to complete every 8 hours. She was instructed that the baby should move at a minimum of ten times within one hour after a meal. The patient was instructed to lay down on her left side twenty minutes after eating and count movements for up to one hour with a target value of ten movements.  She was instructed to notify the office if she did not make that target after two attempts or if after any attempt there was less than four movements.    The patient reports that the targets have been made Yes.    Insurance: BCBS    IPV bag/mug given:7/10/2024 Michelle Mahmood DO   Genetic Information given/reviewed:  1st trimester education packet given:7/10/2024 Michelle Mahmood DO   2nd trimester education packet given:  3rd trimester education packet given:      FOB Name: Zachery-   KNOWS GENDER - ITS A GIRL (Denise Bass)    Last Pap Smear: records request sent to [facility] 7/10/2024 Michelle Mahmood DO  [x] Urine collected for culture 7/10/2024 Michelle Mahmood DO    [x] Negative date 24   [] Positive date   [x] UDS collected 7/10/2024 NEGATIVE 7/10/2024 Michelle Mahmood DO   [x] Gc/ct collected 7/10/2024 NEGATIVE 7/10/2024 Michelle Mahmood DO   [x] Prenatal Labs completed     Genetic

## 2024-10-17 DIAGNOSIS — O09.93 HIGH-RISK PREGNANCY IN THIRD TRIMESTER: ICD-10-CM

## 2024-10-20 LAB
MICROORGANISM SPEC CULT: NORMAL
SERVICE CMNT-IMP: NORMAL
SPECIMEN DESCRIPTION: NORMAL

## 2024-10-24 ENCOUNTER — ROUTINE PRENATAL (OUTPATIENT)
Dept: OBGYN CLINIC | Age: 29
End: 2024-10-24

## 2024-10-24 VITALS
WEIGHT: 197.25 LBS | SYSTOLIC BLOOD PRESSURE: 112 MMHG | BODY MASS INDEX: 33.86 KG/M2 | DIASTOLIC BLOOD PRESSURE: 70 MMHG

## 2024-10-24 DIAGNOSIS — Z3A.37 37 WEEKS GESTATION OF PREGNANCY: Primary | ICD-10-CM

## 2024-10-24 PROCEDURE — 0502F SUBSEQUENT PRENATAL CARE: CPT | Performed by: OBSTETRICS & GYNECOLOGY

## 2024-10-24 NOTE — PROGRESS NOTES
Mildred Parekh is a 29 y.o. female 37w6d        OB History    Para Term  AB Living   1             SAB IAB Ectopic Molar Multiple Live Births                    # Outcome Date GA Lbr Inderjit/2nd Weight Sex Type Anes PTL Lv   1 Current                Vitals  BP: 112/70  Weight - Scale: 89.5 kg (197 lb 4 oz)      The patient was seen and evaluated. There was positive fetal movements. No contractions or leakage of fluid. Signs and symptoms of labor were reviewed.  The S/S of Pre-Eclampsia were reviewed with the patient in detail. She is to report any of these if they occur. She currently denies any of these.    The patient was instructed on fetal kick counts and was given a kick sheet to complete every 8 hours. She was instructed that the baby should move at a minimum of ten times within one hour after a meal. The patient was instructed to lay down on her left side twenty minutes after eating and count movements for up to one hour with a target value of ten movements.  She was instructed to notify the office if she did not make that target after two attempts or if after any attempt there was less than four movements.    The patient reports that the targets have been made Yes.    Insurance: BCBS    IPV bag/mug given:7/10/2024 Michelle Mahmood DO   Genetic Information given/reviewed:  1st trimester education packet given:7/10/2024 Michelle Mahmood DO   2nd trimester education packet given:  3rd trimester education packet given:      FOB Name: Zachery-   KNOWS GENDER - ITS A GIRL (Denise Bass)    Last Pap Smear: records request sent to [facility] 7/10/2024 Michelle Mahmood DO  [x] Urine collected for culture 7/10/2024 Michelle Mahmood DO    [x] Negative date 24   [] Positive date   [x] UDS collected 7/10/2024 NEGATIVE 7/10/2024 Michelle Mahmood DO   [x] Gc/ct collected 7/10/2024 NEGATIVE 7/10/2024 Michelle Mahmood DO   [x] Prenatal Labs completed     Genetic

## 2024-10-30 ENCOUNTER — ROUTINE PRENATAL (OUTPATIENT)
Dept: OBGYN CLINIC | Age: 29
End: 2024-10-30

## 2024-10-30 VITALS
BODY MASS INDEX: 34.04 KG/M2 | WEIGHT: 198.31 LBS | SYSTOLIC BLOOD PRESSURE: 126 MMHG | DIASTOLIC BLOOD PRESSURE: 70 MMHG

## 2024-10-30 DIAGNOSIS — Z3A.38 38 WEEKS GESTATION OF PREGNANCY: Primary | ICD-10-CM

## 2024-10-30 DIAGNOSIS — O09.93 HIGH-RISK PREGNANCY IN THIRD TRIMESTER: ICD-10-CM

## 2024-10-30 DIAGNOSIS — O35.EXX1: ICD-10-CM

## 2024-10-30 PROCEDURE — 0502F SUBSEQUENT PRENATAL CARE: CPT | Performed by: STUDENT IN AN ORGANIZED HEALTH CARE EDUCATION/TRAINING PROGRAM

## 2024-10-30 NOTE — PROGRESS NOTES
Apts with :  [x] Date: 24  Gestational Age: 26w3d  [x] Date: 2024 Gestational Age: 30w5d  [x] Date: 10/16/24 Gestational Age: 36w5d    Apts with :  [x] Date: 7/10/24 Gestational Age: 22w5d  [x] Date: 10/3 Gestational Age: 34w  [x] Date: 10/30/2024  Gestational Age: 38w5d        Counseling   - Warning signs reviewed and recommendations when to call or present to the hospital if she experiences signs or symptoms of  labor and pre-eclampsia were reviewed.   - The patient was counseled on labor and delivery. Route of delivery and counseling on vaginal, operative vaginal, and  sections were completed with the risks of each to both the patient as well as her baby. The possibility of a blood transfusion was discussed as well.   - The patient was counseled on types of analgesia during labor, the need to choose her pediatrician, and postpartum plans for contraception  - The patient was counseled on the call ahead policy. She has been instructed to call the office at anytime prior to going into the hospital if possible. Exceptions were reviewed including but not limited to: decreased fetal movement, vaginal Bleeding or hemorrhage, trauma, readily expectant delivery, or any instance where she feels 911 should be utilized.    The problem list reflects the active issues addressed during today's visit.      Patient Active Problem List    Diagnosis Date Noted    Renal agenesis of fetus affecting antepartum care of mother, fetus 1 07/10/2024     Return in about 1 week (around 2024).    DO Dayanna Perez  [address]  [facility], [address]  10/30/2024, 3:53 PM

## 2024-11-01 ENCOUNTER — TELEPHONE (OUTPATIENT)
Dept: OBGYN CLINIC | Age: 29
End: 2024-11-01

## 2024-11-01 NOTE — TELEPHONE ENCOUNTER
Pt called to confirm how to take her medication with iol scheduled on the 5th- she is taking her pnv iron and asa    Per dr chung ok to continue all medications. If she would like to stop the asa she can     Pt was informed of all recommendations

## 2024-11-05 ENCOUNTER — APPOINTMENT (OUTPATIENT)
Dept: LABOR AND DELIVERY | Age: 29
DRG: 998 | End: 2024-11-05
Payer: COMMERCIAL

## 2024-11-05 ENCOUNTER — HOSPITAL ENCOUNTER (INPATIENT)
Age: 29
LOS: 1 days | Discharge: LEFT BEFORE DISCHARGE | DRG: 998 | End: 2024-11-06
Attending: OBSTETRICS & GYNECOLOGY | Admitting: STUDENT IN AN ORGANIZED HEALTH CARE EDUCATION/TRAINING PROGRAM
Payer: COMMERCIAL

## 2024-11-05 PROBLEM — Z3A.39 39 WEEKS GESTATION OF PREGNANCY: Status: ACTIVE | Noted: 2024-11-05

## 2024-11-05 LAB
ABO + RH BLD: NORMAL
AMPHET UR QL SCN: NEGATIVE
ARM BAND NUMBER: NORMAL
BARBITURATES UR QL SCN: NEGATIVE
BASOPHILS # BLD: 0.11 K/UL (ref 0–0.2)
BASOPHILS NFR BLD: 1 % (ref 0–2)
BENZODIAZ UR QL: NEGATIVE
BLOOD BANK SAMPLE EXPIRATION: NORMAL
BLOOD GROUP ANTIBODIES SERPL: NEGATIVE
CANNABINOIDS UR QL SCN: NEGATIVE
COCAINE UR QL SCN: NEGATIVE
EOSINOPHIL # BLD: 0.14 K/UL (ref 0–0.44)
EOSINOPHILS RELATIVE PERCENT: 1 % (ref 1–4)
ERYTHROCYTE [DISTWIDTH] IN BLOOD BY AUTOMATED COUNT: 13.7 % (ref 11.8–14.4)
FENTANYL UR QL: NEGATIVE
HCT VFR BLD AUTO: 34 % (ref 36.3–47.1)
HGB BLD-MCNC: 11.5 G/DL (ref 11.9–15.1)
IMM GRANULOCYTES # BLD AUTO: 0.35 K/UL (ref 0–0.3)
IMM GRANULOCYTES NFR BLD: 2 %
LYMPHOCYTES NFR BLD: 2.37 K/UL (ref 1.1–3.7)
LYMPHOCYTES RELATIVE PERCENT: 15 % (ref 24–43)
MCH RBC QN AUTO: 30.5 PG (ref 25.2–33.5)
MCHC RBC AUTO-ENTMCNC: 33.8 G/DL (ref 28.4–34.8)
MCV RBC AUTO: 90.2 FL (ref 82.6–102.9)
METHADONE UR QL: NEGATIVE
MONOCYTES NFR BLD: 0.95 K/UL (ref 0.1–1.2)
MONOCYTES NFR BLD: 6 % (ref 3–12)
NEUTROPHILS NFR BLD: 75 % (ref 36–65)
NEUTS SEG NFR BLD: 11.78 K/UL (ref 1.5–8.1)
NRBC BLD-RTO: 0 PER 100 WBC
OPIATES UR QL SCN: NEGATIVE
OXYCODONE UR QL SCN: NEGATIVE
PCP UR QL SCN: NEGATIVE
PLATELET # BLD AUTO: 206 K/UL (ref 138–453)
PMV BLD AUTO: 10.9 FL (ref 8.1–13.5)
RBC # BLD AUTO: 3.77 M/UL (ref 3.95–5.11)
T PALLIDUM AB SER QL IA: NONREACTIVE
TEST INFORMATION: NORMAL
WBC OTHER # BLD: 15.7 K/UL (ref 3.5–11.3)

## 2024-11-05 PROCEDURE — 86901 BLOOD TYPING SEROLOGIC RH(D): CPT

## 2024-11-05 PROCEDURE — 2580000003 HC RX 258: Performed by: STUDENT IN AN ORGANIZED HEALTH CARE EDUCATION/TRAINING PROGRAM

## 2024-11-05 PROCEDURE — 80307 DRUG TEST PRSMV CHEM ANLYZR: CPT

## 2024-11-05 PROCEDURE — 86900 BLOOD TYPING SEROLOGIC ABO: CPT

## 2024-11-05 PROCEDURE — 6370000000 HC RX 637 (ALT 250 FOR IP)

## 2024-11-05 PROCEDURE — 86850 RBC ANTIBODY SCREEN: CPT

## 2024-11-05 PROCEDURE — 1220000000 HC SEMI PRIVATE OB R&B

## 2024-11-05 PROCEDURE — 85025 COMPLETE CBC W/AUTO DIFF WBC: CPT

## 2024-11-05 PROCEDURE — 6370000000 HC RX 637 (ALT 250 FOR IP): Performed by: STUDENT IN AN ORGANIZED HEALTH CARE EDUCATION/TRAINING PROGRAM

## 2024-11-05 PROCEDURE — 86780 TREPONEMA PALLIDUM: CPT

## 2024-11-05 RX ORDER — SODIUM CHLORIDE, SODIUM LACTATE, POTASSIUM CHLORIDE, AND CALCIUM CHLORIDE .6; .31; .03; .02 G/100ML; G/100ML; G/100ML; G/100ML
1000 INJECTION, SOLUTION INTRAVENOUS PRN
Status: DISCONTINUED | OUTPATIENT
Start: 2024-11-05 | End: 2024-11-07 | Stop reason: HOSPADM

## 2024-11-05 RX ORDER — SODIUM CHLORIDE 0.9 % (FLUSH) 0.9 %
5-40 SYRINGE (ML) INJECTION EVERY 12 HOURS SCHEDULED
Status: DISCONTINUED | OUTPATIENT
Start: 2024-11-05 | End: 2024-11-07 | Stop reason: HOSPADM

## 2024-11-05 RX ORDER — ONDANSETRON 4 MG/1
4 TABLET, ORALLY DISINTEGRATING ORAL EVERY 6 HOURS PRN
Status: CANCELLED | OUTPATIENT
Start: 2024-11-05

## 2024-11-05 RX ORDER — SODIUM CHLORIDE 9 MG/ML
INJECTION, SOLUTION INTRAVENOUS PRN
Status: DISCONTINUED | OUTPATIENT
Start: 2024-11-05 | End: 2024-11-07 | Stop reason: HOSPADM

## 2024-11-05 RX ORDER — ONDANSETRON 2 MG/ML
4 INJECTION INTRAMUSCULAR; INTRAVENOUS EVERY 6 HOURS PRN
Status: CANCELLED | OUTPATIENT
Start: 2024-11-05

## 2024-11-05 RX ORDER — SODIUM CHLORIDE, SODIUM LACTATE, POTASSIUM CHLORIDE, AND CALCIUM CHLORIDE .6; .31; .03; .02 G/100ML; G/100ML; G/100ML; G/100ML
500 INJECTION, SOLUTION INTRAVENOUS PRN
Status: DISCONTINUED | OUTPATIENT
Start: 2024-11-05 | End: 2024-11-07 | Stop reason: HOSPADM

## 2024-11-05 RX ORDER — ACETAMINOPHEN 500 MG
1000 TABLET ORAL EVERY 6 HOURS PRN
Status: DISCONTINUED | OUTPATIENT
Start: 2024-11-05 | End: 2024-11-07 | Stop reason: HOSPADM

## 2024-11-05 RX ORDER — SODIUM CHLORIDE 0.9 % (FLUSH) 0.9 %
5-40 SYRINGE (ML) INJECTION PRN
Status: DISCONTINUED | OUTPATIENT
Start: 2024-11-05 | End: 2024-11-07 | Stop reason: HOSPADM

## 2024-11-05 RX ORDER — SODIUM CHLORIDE, SODIUM LACTATE, POTASSIUM CHLORIDE, CALCIUM CHLORIDE 600; 310; 30; 20 MG/100ML; MG/100ML; MG/100ML; MG/100ML
INJECTION, SOLUTION INTRAVENOUS CONTINUOUS
Status: DISCONTINUED | OUTPATIENT
Start: 2024-11-05 | End: 2024-11-07 | Stop reason: HOSPADM

## 2024-11-05 RX ADMIN — ACETAMINOPHEN 1000 MG: 500 TABLET ORAL at 21:49

## 2024-11-05 RX ADMIN — SODIUM CHLORIDE, PRESERVATIVE FREE 10 ML: 5 INJECTION INTRAVENOUS at 21:28

## 2024-11-05 RX ADMIN — Medication 25 MCG: at 21:50

## 2024-11-06 VITALS
OXYGEN SATURATION: 98 % | HEART RATE: 92 BPM | TEMPERATURE: 98.2 F | RESPIRATION RATE: 18 BRPM | SYSTOLIC BLOOD PRESSURE: 125 MMHG | DIASTOLIC BLOOD PRESSURE: 78 MMHG

## 2024-11-06 LAB
ALBUMIN SERPL-MCNC: 3.7 G/DL (ref 3.5–5.2)
ALBUMIN/GLOB SERPL: 1.5 {RATIO} (ref 1–2.5)
ALP SERPL-CCNC: 186 U/L (ref 35–104)
ALT SERPL-CCNC: 10 U/L (ref 10–35)
ANION GAP SERPL CALCULATED.3IONS-SCNC: 12 MMOL/L (ref 9–16)
AST SERPL-CCNC: 17 U/L (ref 10–35)
BASOPHILS # BLD: 0.11 K/UL (ref 0–0.2)
BASOPHILS NFR BLD: 1 % (ref 0–2)
BILIRUB SERPL-MCNC: 0.2 MG/DL (ref 0–1.2)
BUN SERPL-MCNC: 9 MG/DL (ref 6–20)
CALCIUM SERPL-MCNC: 9.2 MG/DL (ref 8.6–10.4)
CHLORIDE SERPL-SCNC: 103 MMOL/L (ref 98–107)
CO2 SERPL-SCNC: 21 MMOL/L (ref 20–31)
CREAT SERPL-MCNC: 0.7 MG/DL (ref 0.6–0.9)
CREAT UR-MCNC: 29.4 MG/DL (ref 28–217)
EOSINOPHIL # BLD: 0.09 K/UL (ref 0–0.44)
EOSINOPHILS RELATIVE PERCENT: 1 % (ref 1–4)
ERYTHROCYTE [DISTWIDTH] IN BLOOD BY AUTOMATED COUNT: 13.9 % (ref 11.8–14.4)
GFR, ESTIMATED: >90 ML/MIN/1.73M2
GLUCOSE SERPL-MCNC: 108 MG/DL (ref 74–99)
HCT VFR BLD AUTO: 36.6 % (ref 36.3–47.1)
HGB BLD-MCNC: 12 G/DL (ref 11.9–15.1)
IMM GRANULOCYTES # BLD AUTO: 0.31 K/UL (ref 0–0.3)
IMM GRANULOCYTES NFR BLD: 2 %
LYMPHOCYTES NFR BLD: 1.97 K/UL (ref 1.1–3.7)
LYMPHOCYTES RELATIVE PERCENT: 12 % (ref 24–43)
MCH RBC QN AUTO: 30 PG (ref 25.2–33.5)
MCHC RBC AUTO-ENTMCNC: 32.8 G/DL (ref 28.4–34.8)
MCV RBC AUTO: 91.5 FL (ref 82.6–102.9)
MONOCYTES NFR BLD: 0.9 K/UL (ref 0.1–1.2)
MONOCYTES NFR BLD: 6 % (ref 3–12)
NEUTROPHILS NFR BLD: 78 % (ref 36–65)
NEUTS SEG NFR BLD: 12.46 K/UL (ref 1.5–8.1)
NRBC BLD-RTO: 0 PER 100 WBC
PLATELET # BLD AUTO: 226 K/UL (ref 138–453)
PMV BLD AUTO: 11.1 FL (ref 8.1–13.5)
POTASSIUM SERPL-SCNC: 4 MMOL/L (ref 3.7–5.3)
PROT SERPL-MCNC: 6.2 G/DL (ref 6.6–8.7)
RBC # BLD AUTO: 4 M/UL (ref 3.95–5.11)
SODIUM SERPL-SCNC: 136 MMOL/L (ref 136–145)
TOTAL PROTEIN, URINE: <4 MG/DL
URINE TOTAL PROTEIN CREATININE RATIO: NORMAL
WBC OTHER # BLD: 15.8 K/UL (ref 3.5–11.3)

## 2024-11-06 PROCEDURE — 82570 ASSAY OF URINE CREATININE: CPT

## 2024-11-06 PROCEDURE — 80053 COMPREHEN METABOLIC PANEL: CPT

## 2024-11-06 PROCEDURE — 36415 COLL VENOUS BLD VENIPUNCTURE: CPT

## 2024-11-06 PROCEDURE — 3E0DXGC INTRODUCTION OF OTHER THERAPEUTIC SUBSTANCE INTO MOUTH AND PHARYNX, EXTERNAL APPROACH: ICD-10-PCS | Performed by: OBSTETRICS & GYNECOLOGY

## 2024-11-06 PROCEDURE — 6370000000 HC RX 637 (ALT 250 FOR IP)

## 2024-11-06 PROCEDURE — 84156 ASSAY OF PROTEIN URINE: CPT

## 2024-11-06 PROCEDURE — 85025 COMPLETE CBC W/AUTO DIFF WBC: CPT

## 2024-11-06 RX ADMIN — Medication 25 MCG: at 02:02

## 2024-11-06 RX ADMIN — Medication 25 MCG: at 16:52

## 2024-11-06 RX ADMIN — Medication 25 MCG: at 06:59

## 2024-11-06 RX ADMIN — Medication 25 MCG: at 12:26

## 2024-11-06 NOTE — DISCHARGE SUMMARY
with exposure. She was counseled on various alternate recommendations to decrease the exposure to secondary smoke to her children.

## 2024-11-06 NOTE — H&P
contractions, few    General appearance:  no apparent distress alert and cooperative  HEENT: head atraumatic, normocephalic, moist mucous membranes, trachea midline  Neurologic:  alert, oriented, normal speech, no focal findings or movement disorder noted  Lungs:  No increased work of breathing, no conversational dyspnea, equal chest wall rise bilaterally  Heart:  regular rate  Abdomen:  soft, gravid, non-tender, no rebound, guarding, or rigidity, no RUQ or epigastric tenderness, no signs or symptoms of abruption, no signs or symptoms of chorioamnionitis  Extremities:  no calf tenderness, non edematous, no varicosities, full range of motion in all four extremities  Musculoskeletal: Gross strength equal and intact throughout, no gross abnormalities, range of motion normal in hips, knees, shoulders and spine  Psychiatric: Mood appropriate, normal affect   Rectal Exam: not indicated  Pelvic Exam:   Chaperone for Intimate Exam: Chaperone was present for entire exam, Chaperone Name: Amy  Sterile Vaginal Exam:  Cervix: fingertip dilated, 0 % effaced, -3 station, posterior position (out of 3 station), medium consistency, FETAL POSITION: Cephalic (confirmed by ultrasound), Membranes intact,    Bishops Score: 1     0 1 2 3   Position Posterior Intermediate Anterior -   Consistency Firm Intermediate Soft -   Effacement 0-30% 31-50% 51-80% >80%   Dilation 0cm 1-2cm 3-4cm >5cm   Fetal Station -3 -2 -1, 0 +1, +2     LIMITED BEDSIDE US:  Position: Cephalic  Placental Location: anterior  Fetal Heart Tones: Present  Fetal Movement: Present  Amniotic Fluid Index/Volume: adequate 2x2 cm fluid pocket  Estimated Fetal Weight:  8 lbs 10 oz    PRENATAL LAB RESULTS:  Blood Type/Rh: O pos  Antibody Screen: negative  Hemoglobin, Hematocrit, Platelets: 11.9/35.9/286  Rubella: immune  T. Pallidum, IgG: non-reactive  Hepatitis B Surface Antigen: non-reactive   Hepatitis C Antibody: non-reactive   HIV: non-reactive   Gonorrhea:

## 2024-11-06 NOTE — CARE COORDINATION
ANTEPARTUM NOTE    39 weeks gestation of pregnancy [Z3A.39]    Mildred  was admitted to L&D on 11/5/24  for IOL @ 39w4d     OB GYN Provider: Dr. Flores    Will meet with patient after delivery to verify name/address/phone/insurance and discuss discharge planning.     Anticipate DC home 2 nights after vaginal delivery or 4 nights after C/S delivery as long as hemodynamically stable.

## 2024-11-07 ENCOUNTER — TELEPHONE (OUTPATIENT)
Dept: OBGYN CLINIC | Age: 29
End: 2024-11-07

## 2024-11-07 NOTE — TELEPHONE ENCOUNTER
----- Message from Dr. Michelle Mahmood, DO sent at 2024  3:00 PM EST -----  Regarding: patient phone call  Patient is >39 weeks. Was admitted for risk reducing induction of labor but did not make cervical change after multiple doses of cytotec. She did unfortunately meet criteria for gestational hypertension. In order to keep her and the baby safe I would recommend that she return to the hospital for either repeat induction of labor or  section if she declines repeat induction. Per the documentation she feels that she does not have a hypertensive disorder and that her blood pressure was elevated due to emotions. The blood pressures were still present and do put both her and that baby at risk of adverse outcomes for things like organ damage, stroke, heart attack, placental abruption, maternal and or fetal injury or death. She can come in tomorrow for a BP check or possible visit however, I am not in office tomorrow as I will be operating. We just want you and the baby to be safe and have the best possible outcome.     Dr. Mahmood

## 2024-11-07 NOTE — FLOWSHEET NOTE
Pt given instructions for reasons to return to hospital, verbalizes understanding. Home per private vehicle with .

## 2024-11-07 NOTE — TELEPHONE ENCOUNTER
Spoke with patient after she left the hosptial after induction was not progressing- did give her all of dr chung recommendations and why we would recommend induction of labor.     Pt stated she understood but felt at the moment she was very stressed out and the best thing for her and the baby was to leave- did tell pt did understand her feelings - just with the dx now of gestational diabetes on our end as her ob provider we do have to give the recommendations and make pt aware of the potential adverse outcomes . Pt stated she does understand- just more frustrated that she had expectations of her delivery and what she wanted vs what was actually happening- she stated she was not told about the blood pressure issue until after she decided to go home and felt that should have been shared right away- she did state she was very upset and felt that was why her bp was up. She was frustrated with the fact that night shift she was checked every 4 hours which was fine but felt during day shift it was more frequent and was always told she was still finger tip and she stated how could that be when I was like that last week with dr chung- she was ok with taking the cytotec but not comfortable with the cervadil or the balloon- she did state she will obviously will do what is safe for her an baby if c./s was needed but was not too sure with the other medication.     Told pt understood how she felt and what she is feeling or was is not wrong and valid- did state she was doing what she was thought was best for her and the baby and nothing wrong with that. Just on our end we wanted to reach out and give all the information with this now dx on her chart and recommendations- did state if pt would like we can do a bp check in office per provider and follow up virtual visit with dr harden to discuss recent visit and poc moving forward. Pt is agreeable with this - she would like to try and do as much as she can at home to help

## 2024-11-07 NOTE — PROGRESS NOTES
Labor Progress Note    Mildred Parekh is a 29 y.o. female  at 39w5d  The patient was seen and examined. Her pain is well controlled. She reports fetal movement is present, complains of contractions, denies loss of fluid, denies vaginal bleeding.       Vital Signs:  Vitals:    24 0700 24 0803 24 1225 24 1344   BP: 135/88 127/75 125/80 (!) 140/89   Pulse: 92 80 90 91   Resp:  16 17    Temp: 98.4 °F (36.9 °C) 98.1 °F (36.7 °C) 98.2 °F (36.8 °C)    TempSrc:  Oral Oral    SpO2: 98% 98%           FHT: Baseline 140, moderate variability, accelerations present, decelerations absent, Category 1 tracing  Contractions: irregular, every 2-4 minutes  Cervical Exam: fingertip cm dilated, 0 effaced, -3 station  Chaperone for Intimate Exam: Chaperone was present for entire exam, Chaperone Name: Ebony WALLS  Pitocin: @ 0 mu/min    Membranes: Intact  Scalp Electrode in place: absent  Intrauterine Pressure Catheter in Place: absent    Interventions: SVE    Assessment/Plan:  Mildred Parekh is a 29 y.o. female  at 39w5d admitted for IUP   - GBS negative, No indication and Pen G for GBS prophylaxis     RR IOL    - Saline Locked IV   - cEFM and TOCO: Cat 1 with irregular contractions   - Membranes intact   - SVE fingertip/0%/-3   - Continue to monitor closely      Attending updated and in agreement with plan    Eileen Elizabeth DO  Ob/Gyn Resident  [facility]   2024 4:15 PM    
Labor Progress Note    Mildred Parekh is a 29 y.o. female  at 39w5d  The patient was seen and examined. Her pain is well controlled. She reports fetal movement is present, complains of contractions, denies loss of fluid, denies vaginal bleeding.       Vital Signs:  Vitals:    24 2200   BP: (!) 146/84   Pulse: 88   Resp: 16   Temp: 97.7 °F (36.5 °C)   SpO2: 99%       FHT: 120, moderate variability, accelerations present, decelerations absent  Contractions: irritability    Pitocin: N/a    Membranes: Intact  Scalp Electrode in place: absent  Intrauterine Pressure Catheter in Place: absent    Interventions: None    Assessment/Plan:  Mildred Parekh is a 29 y.o. female  at 39w5d admitted for RR IOL   - GBS negative, No indication for GBS prophylaxis   - cEFM/TOCO   - Cytotec 25 mcg PO x1   - Next due for another dose now, 2nd dose of 25 mcg PO x1 is ordered   - Patient unsure if she desires a hassan balloon and would like multiple rounds of cervical ripening first.    Attending updated and in agreement with plan.    Chantale Becerra MD  Ob/Gyn Resident  2024, 1:57 AM    
Labor Progress Note    Mildred Parekh is a 29 y.o. female  at 39w5d  The patient was seen and examined. Her pain is well controlled. She reports fetal movement is present, complains of some contractions, denies loss of fluid, denies vaginal bleeding.       Vital Signs:  Vitals:    24 1344 24 1652 24 1750 24 1812   BP: (!) 140/89 (!) 147/87 (!) 142/95 125/78   Pulse: 91 90 92 92   Resp:  18     Temp:  98.2 °F (36.8 °C)     TempSrc:  Oral     SpO2:           FHT: 130, moderate variability, accelerations present, decelerations absent  Contractions: irregular    Chaperone for Intimate Exam: Chaperone was present for entire exam, Chaperone Name: TITI Reyes  Cervical Exam: fingertip cm dilated, 0 effaced, -3 station  Pitocin: N/a    Membranes: Intact  Scalp Electrode in place: absent  Intrauterine Pressure Catheter in Place: absent    Interventions: SVE    Assessment/Plan:  Mildred Parekh is a 29 y.o. female  at 39w5d admitted for RR IOL   - GBS negative, No indication for GBS prophylaxis   - cEFM/TOCO   - S/p Cytotec 25 mcg PO x5  - Patient is seen at bedside and requesting a cervical check.  - SVE: fingertip/0% effaced/-3 station  -SVE is unchanged and patient is requesting to be discharged home.  She is counseled that she met criteria for gestational hypertension and if she desires discharge she will have to sign out AGAINST MEDICAL ADVICE.  -Patient is counseled on options including: Proceeding with induction of labor with sixth dose of Cytotec followed by Cervidil for further cervical ripening.  Patient still adamantly does not desire a Ledezma balloon.  Proceeding with her primary  which patient declines.  -She is counseled that since she met criteria for gestational hypertension, her preeclampsia labs and PC ratio are within normal limits.  Induction of labor now has a medical reasoning and recommendation is to remain inpatient and proceed with a form of delivery.  - The patient has 
Labor Progress Note    Mildred Parekh is a 29 y.o. female  at 39w5d  The patient was seen and examined. Her pain is well controlled. She reports fetal movement is present, complains of some contractions, denies loss of fluid, denies vaginal bleeding.       Vital Signs:  Vitals:    24 2200 24 0200   BP: (!) 146/84 129/71   Pulse: 88 82   Resp: 16    Temp: 97.7 °F (36.5 °C) 98.4 °F (36.9 °C)   SpO2: 99% 98%       FHT: 130, moderate variability, accelerations present, decelerations absent  Contractions: irregular    Pitocin: N/a  Membranes: Intact  Scalp Electrode in place: absent  Intrauterine Pressure Catheter in Place: absent    Interventions: None     Assessment/Plan:  Mildred Parekh is a 29 y.o. female  at 39w5d admitted for RR IOL   - GBS negative, No indication for GBS prophylaxis   - cEFM/TOCO   - Cytotec 25 mcg PO x2; 3rd dose is ordered to be given now   - Patient is seen at bedside and offered a cervical exam, patient declined, stating she would like to wait for check and after the next dose of Cytotec.   - Continue to monitor closely    Attending updated and in agreement with plan.    Chantale Becerra MD  Ob/Gyn Resident  2024, 6:53 AM          Attending Physician Statement      I have personally seen, evaluated and discussed the care of Mildred Parekh, including pertinent history and exam findings with the resident. I have reviewed and edited their note in the electronic medical record. The key elements of all parts of the encounter have been performed/reviewed by me.  I agree with the assessment, plan and orders as documented by the resident. (GC Modifier)    Vitals:    24 2200 24 0200 24 0700 24 0803   BP: (!) 146/84 129/71 135/88 127/75   Pulse: 88 82 92 80   Resp: 16   16   Temp: 97.7 °F (36.5 °C) 98.4 °F (36.9 °C) 98.4 °F (36.9 °C) 98.1 °F (36.7 °C)   TempSrc:    Oral   SpO2: 99% 98% 98% 98%      Patient working well with labor, starting to feel some contractions. 
CBC, CMP, P/C ordered    Senior resident updated and in agreement with plan.    Chantale Becerra MD  Ob/Gyn Resident  11/6/2024, 7:32 PM

## 2024-11-08 ENCOUNTER — TELEMEDICINE (OUTPATIENT)
Dept: OBGYN CLINIC | Age: 29
End: 2024-11-08

## 2024-11-08 ENCOUNTER — NURSE ONLY (OUTPATIENT)
Dept: OBGYN CLINIC | Age: 29
End: 2024-11-08

## 2024-11-08 ENCOUNTER — ROUTINE PRENATAL (OUTPATIENT)
Dept: OBGYN CLINIC | Age: 29
End: 2024-11-08

## 2024-11-08 VITALS
DIASTOLIC BLOOD PRESSURE: 70 MMHG | HEIGHT: 64 IN | SYSTOLIC BLOOD PRESSURE: 128 MMHG | WEIGHT: 200.2 LBS | HEART RATE: 100 BPM | BODY MASS INDEX: 34.18 KG/M2

## 2024-11-08 VITALS — WEIGHT: 199 LBS | SYSTOLIC BLOOD PRESSURE: 124 MMHG | DIASTOLIC BLOOD PRESSURE: 80 MMHG | BODY MASS INDEX: 34.16 KG/M2

## 2024-11-08 DIAGNOSIS — O48.0 POST-TERM PREGNANCY, 40-42 WEEKS OF GESTATION: ICD-10-CM

## 2024-11-08 DIAGNOSIS — Z3A.40 40 WEEKS GESTATION OF PREGNANCY: Primary | ICD-10-CM

## 2024-11-08 DIAGNOSIS — O09.93 HIGH-RISK PREGNANCY IN THIRD TRIMESTER: ICD-10-CM

## 2024-11-08 DIAGNOSIS — O36.63X0 EXCESSIVE FETAL GROWTH AFFECTING MANAGEMENT OF PREGNANCY IN THIRD TRIMESTER, SINGLE OR UNSPECIFIED FETUS: ICD-10-CM

## 2024-11-08 DIAGNOSIS — Z01.30 BP CHECK: Primary | ICD-10-CM

## 2024-11-08 NOTE — PROGRESS NOTES
Patient presented for BP check- /70  .  she is not taking any as BP meds.    Patient is getting a 40 week scan.

## 2024-11-09 NOTE — PROGRESS NOTES
NST (Non Stress Test) Worksheet  24    Diagnosis:  Mildred Parekh is a 29 y.o. female    40w1d  1. 40 weeks gestation of pregnancy    2. Excessive fetal growth affecting management of pregnancy in third trimester, single or unspecified fetus          Indication for Testing:  post-dates pregnancy      Scheduled Procedure:  Non Stress Test (59025-CPT)      Findings:  Total time of tracing to complete testing: >20 Minutes (Minimum must be 20 minutes)  NST is  reactive  CATEGORY 1 TRACING  Variability is moderate  Baseline FHR of 140 bpm    Accelerations are identified 15 beats above the baseline for 15 minutes: Yes (>32 weeks gestation)    Accelerations are identified 10 beats above the baseline for 10 minutes: NA (28-32 weeks gestation)      Insurance: BCBS    IPV bag/mug given:7/10/2024 Michelle Mahmood DO   Genetic Information given/reviewed:  1st trimester education packet given:7/10/2024 Michelle Mahmood DO   2nd trimester education packet given:  3rd trimester education packet given:      FOB Name: Zachery-   KNOWS GENDER - ITS A GIRL (Denise Bass)    Last Pap Smear: records request sent to [facility] 7/10/2024 Michelle Mahmood DO  [x] Urine collected for culture 7/10/2024 Michelle Mahmood DO    [x] Negative date 24   [] Positive date   [x] UDS collected 7/10/2024 NEGATIVE 7/10/2024 Michelle Mahmood DO   [x] Gc/ct collected 7/10/2024 NEGATIVE 7/10/2024 Michelle Mahmood DO   [x] Prenatal Labs completed     Genetic Screening:  [x]Accepted NIPS vs FTS : NIPT W/GENDER  [x]Completed  [x] Low risk     Carrier Screening:  [x] Declined    Baby aspirin screen:  [x]Positive (BMI + parity)  []Negative    Early 1 hour GTT:  [x]Yes (BMI) 60 pass   [] No    AFP: declined  []Ordered  []Completed    Anatomy scan:  [x]Referral Sent 7/10/2024 Michelle Mahmood DO   [x]Scheduled 24  [x]Completed: Anterior placenta, normal cord insertion, 3VC, Female. ABSENT RIGHT FETAL KIDNEY

## 2024-11-12 ENCOUNTER — ROUTINE PRENATAL (OUTPATIENT)
Dept: OBGYN CLINIC | Age: 29
End: 2024-11-12

## 2024-11-12 ENCOUNTER — HOSPITAL ENCOUNTER (INPATIENT)
Age: 29
LOS: 3 days | Discharge: HOME | End: 2024-11-15
Attending: OBSTETRICS & GYNECOLOGY | Admitting: OBSTETRICS & GYNECOLOGY
Payer: COMMERCIAL

## 2024-11-12 VITALS — DIASTOLIC BLOOD PRESSURE: 90 MMHG | SYSTOLIC BLOOD PRESSURE: 130 MMHG | WEIGHT: 203.6 LBS | BODY MASS INDEX: 34.95 KG/M2

## 2024-11-12 DIAGNOSIS — O13.3 GESTATIONAL HYPERTENSION, THIRD TRIMESTER: ICD-10-CM

## 2024-11-12 DIAGNOSIS — Z3A.40 40 WEEKS GESTATION OF PREGNANCY: ICD-10-CM

## 2024-11-12 DIAGNOSIS — O09.93 HRP (HIGH RISK PREGNANCY), THIRD TRIMESTER: Primary | ICD-10-CM

## 2024-11-12 LAB
ABO + RH BLD: NORMAL
ALBUMIN SERPL-MCNC: 3.6 G/DL (ref 3.5–5.2)
ALBUMIN/GLOB SERPL: 1.2 {RATIO} (ref 1–2.5)
ALP SERPL-CCNC: 191 U/L (ref 35–104)
ALT SERPL-CCNC: 6 U/L (ref 10–35)
AMPHET UR QL SCN: NEGATIVE
ANION GAP SERPL CALCULATED.3IONS-SCNC: 12 MMOL/L (ref 9–16)
ARM BAND NUMBER: NORMAL
AST SERPL-CCNC: 16 U/L (ref 10–35)
BARBITURATES UR QL SCN: NEGATIVE
BASOPHILS # BLD: 0.07 K/UL (ref 0–0.2)
BASOPHILS NFR BLD: 1 % (ref 0–2)
BENZODIAZ UR QL: NEGATIVE
BILIRUB SERPL-MCNC: <0.2 MG/DL (ref 0–1.2)
BLOOD BANK SAMPLE EXPIRATION: NORMAL
BLOOD GROUP ANTIBODIES SERPL: NEGATIVE
BUN SERPL-MCNC: 11 MG/DL (ref 6–20)
CALCIUM SERPL-MCNC: 9.2 MG/DL (ref 8.6–10.4)
CANNABINOIDS UR QL SCN: NEGATIVE
CHLORIDE SERPL-SCNC: 103 MMOL/L (ref 98–107)
CO2 SERPL-SCNC: 21 MMOL/L (ref 20–31)
COCAINE UR QL SCN: NEGATIVE
CREAT SERPL-MCNC: 0.6 MG/DL (ref 0.6–0.9)
CREAT UR-MCNC: 64.2 MG/DL (ref 28–217)
EOSINOPHIL # BLD: 0.09 K/UL (ref 0–0.44)
EOSINOPHILS RELATIVE PERCENT: 1 % (ref 1–4)
ERYTHROCYTE [DISTWIDTH] IN BLOOD BY AUTOMATED COUNT: 13.7 % (ref 11.8–14.4)
FENTANYL UR QL: NEGATIVE
GFR, ESTIMATED: >90 ML/MIN/1.73M2
GLUCOSE SERPL-MCNC: 85 MG/DL (ref 74–99)
HCT VFR BLD AUTO: 35.3 % (ref 36.3–47.1)
HGB BLD-MCNC: 11.7 G/DL (ref 11.9–15.1)
IMM GRANULOCYTES # BLD AUTO: 0.15 K/UL (ref 0–0.3)
IMM GRANULOCYTES NFR BLD: 1 %
LYMPHOCYTES NFR BLD: 1.76 K/UL (ref 1.1–3.7)
LYMPHOCYTES RELATIVE PERCENT: 12 % (ref 24–43)
MCH RBC QN AUTO: 29.6 PG (ref 25.2–33.5)
MCHC RBC AUTO-ENTMCNC: 33.1 G/DL (ref 28.4–34.8)
MCV RBC AUTO: 89.4 FL (ref 82.6–102.9)
METHADONE UR QL: NEGATIVE
MONOCYTES NFR BLD: 0.79 K/UL (ref 0.1–1.2)
MONOCYTES NFR BLD: 5 % (ref 3–12)
NEUTROPHILS NFR BLD: 80 % (ref 36–65)
NEUTS SEG NFR BLD: 11.66 K/UL (ref 1.5–8.1)
NRBC BLD-RTO: 0 PER 100 WBC
OPIATES UR QL SCN: NEGATIVE
OXYCODONE UR QL SCN: NEGATIVE
PCP UR QL SCN: NEGATIVE
PLATELET # BLD AUTO: 243 K/UL (ref 138–453)
PMV BLD AUTO: 11.2 FL (ref 8.1–13.5)
POTASSIUM SERPL-SCNC: 4 MMOL/L (ref 3.7–5.3)
PROT SERPL-MCNC: 6.6 G/DL (ref 6.6–8.7)
RBC # BLD AUTO: 3.95 M/UL (ref 3.95–5.11)
SODIUM SERPL-SCNC: 136 MMOL/L (ref 136–145)
T PALLIDUM AB SER QL IA: NONREACTIVE
TEST INFORMATION: NORMAL
TOTAL PROTEIN, URINE: 10 MG/DL
URINE TOTAL PROTEIN CREATININE RATIO: 0.16 (ref 0–0.2)
WBC OTHER # BLD: 14.5 K/UL (ref 3.5–11.3)

## 2024-11-12 PROCEDURE — 6370000000 HC RX 637 (ALT 250 FOR IP)

## 2024-11-12 PROCEDURE — 82570 ASSAY OF URINE CREATININE: CPT

## 2024-11-12 PROCEDURE — 85025 COMPLETE CBC W/AUTO DIFF WBC: CPT

## 2024-11-12 PROCEDURE — 1220000000 HC SEMI PRIVATE OB R&B

## 2024-11-12 PROCEDURE — 80053 COMPREHEN METABOLIC PANEL: CPT

## 2024-11-12 PROCEDURE — 36415 COLL VENOUS BLD VENIPUNCTURE: CPT

## 2024-11-12 PROCEDURE — 84156 ASSAY OF PROTEIN URINE: CPT

## 2024-11-12 PROCEDURE — 86850 RBC ANTIBODY SCREEN: CPT

## 2024-11-12 PROCEDURE — 86900 BLOOD TYPING SEROLOGIC ABO: CPT

## 2024-11-12 PROCEDURE — 3E0P7VZ INTRODUCTION OF HORMONE INTO FEMALE REPRODUCTIVE, VIA NATURAL OR ARTIFICIAL OPENING: ICD-10-PCS | Performed by: OBSTETRICS & GYNECOLOGY

## 2024-11-12 PROCEDURE — 2580000003 HC RX 258

## 2024-11-12 PROCEDURE — 0502F SUBSEQUENT PRENATAL CARE: CPT | Performed by: ADVANCED PRACTICE MIDWIFE

## 2024-11-12 PROCEDURE — 86901 BLOOD TYPING SEROLOGIC RH(D): CPT

## 2024-11-12 PROCEDURE — 80307 DRUG TEST PRSMV CHEM ANLYZR: CPT

## 2024-11-12 PROCEDURE — 86780 TREPONEMA PALLIDUM: CPT

## 2024-11-12 RX ORDER — ACETAMINOPHEN 500 MG
1000 TABLET ORAL EVERY 6 HOURS PRN
Status: DISCONTINUED | OUTPATIENT
Start: 2024-11-12 | End: 2024-11-14 | Stop reason: HOSPADM

## 2024-11-12 RX ORDER — POLYETHYLENE GLYCOL 3350 17 G/17G
17 POWDER, FOR SOLUTION ORAL DAILY
Status: DISCONTINUED | OUTPATIENT
Start: 2024-11-12 | End: 2024-11-14

## 2024-11-12 RX ORDER — SODIUM CHLORIDE, SODIUM LACTATE, POTASSIUM CHLORIDE, AND CALCIUM CHLORIDE .6; .31; .03; .02 G/100ML; G/100ML; G/100ML; G/100ML
1000 INJECTION, SOLUTION INTRAVENOUS PRN
Status: DISCONTINUED | OUTPATIENT
Start: 2024-11-12 | End: 2024-11-14 | Stop reason: HOSPADM

## 2024-11-12 RX ORDER — SODIUM CHLORIDE 0.9 % (FLUSH) 0.9 %
5-40 SYRINGE (ML) INJECTION EVERY 12 HOURS SCHEDULED
Status: DISCONTINUED | OUTPATIENT
Start: 2024-11-12 | End: 2024-11-14 | Stop reason: HOSPADM

## 2024-11-12 RX ORDER — SODIUM CHLORIDE, SODIUM LACTATE, POTASSIUM CHLORIDE, AND CALCIUM CHLORIDE .6; .31; .03; .02 G/100ML; G/100ML; G/100ML; G/100ML
500 INJECTION, SOLUTION INTRAVENOUS PRN
Status: DISCONTINUED | OUTPATIENT
Start: 2024-11-12 | End: 2024-11-14 | Stop reason: HOSPADM

## 2024-11-12 RX ORDER — SODIUM CHLORIDE 0.9 % (FLUSH) 0.9 %
5-40 SYRINGE (ML) INJECTION PRN
Status: DISCONTINUED | OUTPATIENT
Start: 2024-11-12 | End: 2024-11-14 | Stop reason: HOSPADM

## 2024-11-12 RX ORDER — SODIUM CHLORIDE 9 MG/ML
INJECTION, SOLUTION INTRAVENOUS PRN
Status: DISCONTINUED | OUTPATIENT
Start: 2024-11-12 | End: 2024-11-14 | Stop reason: HOSPADM

## 2024-11-12 RX ORDER — SODIUM CHLORIDE, SODIUM LACTATE, POTASSIUM CHLORIDE, CALCIUM CHLORIDE 600; 310; 30; 20 MG/100ML; MG/100ML; MG/100ML; MG/100ML
INJECTION, SOLUTION INTRAVENOUS CONTINUOUS
Status: DISCONTINUED | OUTPATIENT
Start: 2024-11-12 | End: 2024-11-14 | Stop reason: HOSPADM

## 2024-11-12 RX ADMIN — POLYETHYLENE GLYCOL 3350 17 G: 17 POWDER, FOR SOLUTION ORAL at 17:17

## 2024-11-12 RX ADMIN — DINOPROSTONE 10 MG: 10 INSERT VAGINAL at 16:08

## 2024-11-12 RX ADMIN — SODIUM CHLORIDE, POTASSIUM CHLORIDE, SODIUM LACTATE AND CALCIUM CHLORIDE: 600; 310; 30; 20 INJECTION, SOLUTION INTRAVENOUS at 15:01

## 2024-11-12 NOTE — CARE COORDINATION
11/12/24 1446   Readmission Assessment   Number of Days since last admission? 1-7 days   Previous Disposition Other (comment)  (left AMA)   Who is being Interviewed Unable to Complete  (Left AMA previous admission for IOL)   What was the patient's/caregiver's perception as to why they think they needed to return back to the hospital? Other (Comment)  (another attempt for IOL for post dates)   Did you visit your Primary Care Physician after you left the hospital, before you returned this time? No  (n/a)   Why weren't you able to visit your PCP? Other (Comment)  (n/a)   Did you see a specialist, such as Cardiac, Pulmonary, Orthopedic Physician, etc. after you left the hospital? Yes   Who advised the patient to return to the hospital? Physician   Does the patient report anything that got in the way of taking their medications? No   In our efforts to provide the best possible care to you and others like you, can you think of anything that we could have done to help you after you left the hospital the first time, so that you might not have needed to return so soon? Other (Comment)  (Patient left AMA when her IOL was not going as fast as she had planned)

## 2024-11-12 NOTE — FLOWSHEET NOTE
Pt is here for induction. Pt denies any vaginal bleeding, discharge, or leakage of fluid. Pt states occasional contractions. Positive fetal movement

## 2024-11-12 NOTE — CARE COORDINATION
ANTEPARTUM NOTE    40 weeks gestation of pregnancy [Z3A.40]    Mildred was admitted to L&D on 11/12/24 for IOL @ 40w4d    OB GYN Provider: Dr. Flores    Will meet with patient after delivery to verify name/address/phone/insurance and discuss discharge planning.     Anticipate DC home 2 nights after vaginal delivery or 4 nights after C/S delivery as long as hemodynamically stable.

## 2024-11-12 NOTE — H&P
OBSTETRICAL HISTORY AND PHYSICAL  [facility]    Date: 2024       Time: 2:08 PM   Patient Name: Mildred Parekh     Patient : [id number]  Room/Bed: 0709/0709-01    Admission Date/Time: 2024  1:57 PM      CC: IOL 2 gHTN (new dx)     HPI: Mildred Parekh is a 29 y.o.  at 40w4d who presents for IOL 2/2 new diagnosis of gHTN. Patient denies any fever, chills, N/V, headaches, vision changes, chest pain, shortness of breath, RUQ pain, abdominal pain, and increased swelling/tenderness in bilateral lower extremities. Patient denies any vaginal discharge and any urinary complaints. The patient reports fetal movement is present, denies contractions, denies loss of fluid, denies vaginal bleeding.    DATING:  LMP: Patient's last menstrual period was 2024 (exact date).  Estimated Date of Delivery: 24   Based on: LMP confirmed by US, at 10 6/7 weeks GA    PREGNANCY RISK FACTORS:  Patient Active Problem List   Diagnosis    Renal agenesis of fetus affecting antepartum care of mother, fetus 1    39 weeks gestation of pregnancy        Steroids Given In This Pregnancy:  no     REVIEW OF SYSTEMS:   Constitutional: negative fever, negative chills, negative weight changes   HEENT: negative visual disturbances, negative headaches, negative dizziness, negative hearing loss  Breast: Negative breast abnormalities, negative breast lumps, negative nipple discharge  Respiratory: negative dyspnea, negative cough, negative SOB  Cardiovascular: negative chest pain,  negative palpitations, negative arrhythmia, negative syncope   Gastrointestinal: negative abdominal pain, negative RUQ pain, negative N/V, negative diarrhea, negative constipation, negative bowel changes, negative heartburn   Genitourinary: negative dysuria, negative hematuria, negative urinary incontinence, negative vaginal discharge, negative vaginal bleeding or spotting  Dermatological: negative rash, negative pruritis, negative mole  done  msAFP: not done  Non-Invasive Prenatal Testing: low risk for aneuploidy  Anatomy US: anterior placenta, 3VC, female gender, suspected right fetal renal agenesis, otherwise anatomy    ASSESSMENT & PLAN:  Mildred Parekh is a 29 y.o. female  at 40w4d IUP   - GBS negative / Rh positive / R immune   - No indication for GBS prophylaxis    IOL 2/2 gHTN (new dx)  - ATSO Dr. Starr  - BP elevated, non severe on admission. Otherwise VSS afebrile   - cEFM/TOCO   - CBC, T&S, T.Pal, UDS, CMP, P/C ordered    - IVF: LR 125mL/ hr   - BSUS: cephalic, EFW 9#2 (US 24)   - Due to risk factors: EFW, discussed increased risk of shoulder dystocia with patient. Discussed the R/B/A of vaginal delivery VS  delivery due to risk of brachial plexus palsies, clavicular/humeral fracture, long term neurological issues, asphyxia, or  death. Patient verbalized and expressed understanding. Patient would like to proceed with induction of labor at this time.    - SVE: 2   - Plan of Induction: Cervidil   - Continue expectant management      Fetal anomalies    - Noted on MFM anatomy and follow up ultrasounds   - Suspected right renal fetal agenesis, Dilated bowel   - MFM placed referral for pediatric urology   - NIPT low risk for aneuploidy   - Fetal echo wnl    Fhx Down Syndrome    - First degree relative   - NIPT low risk    BMI 34     Patient Active Problem List    Diagnosis Date Noted    39 weeks gestation of pregnancy 2024    Renal agenesis of fetus affecting antepartum care of mother, fetus 1 07/10/2024       Plan discussed with Dr. Starr, who is agreeable.     Steroids given this admission: No    Risks, benefits, alternatives and possible complications have been discussed in detail with the patient. Admission, and post admission procedures and expectations were discussed in detail. All questions were answered.    Attending's Name: Dr. Ro Pina DO  Ob/Gyn Resident  2024, 2:08 PM

## 2024-11-12 NOTE — DISCHARGE SUMMARY
Obstetric Discharge Summary  [facility]    Patient Name: Mildred Parekh  Patient : 1995  Primary Care Physician: Avelina Bonilla APRN - CNP  Admit Date: 2024    Principal Diagnosis: IUP at 40w4d, admitted for IOL 2/2 gHTN     Her pregnancy has been complicated by:   Patient Active Problem List   Diagnosis    Renal agenesis of fetus affecting antepartum care of mother, fetus 1    39 weeks gestation of pregnancy    40 weeks gestation of pregnancy     24 F Apg 8/9 Wt 7#15    Postpartum state       Infection Present?: No  Hospital Acquired: No    Surgical Operations & Procedures:  Analgesia: epidural  Delivery Type: Spontaneous Vaginal Delivery: See Labor and Delivery Summary   Laceration(s): Second degree laceration.  Suture used for repair:  Vicryl 2.0.    Consultations: Anesthesia    Pertinent Findings & Procedures:   Mildred Parekh is a 29 y.o. female  at 40w4d admitted for IOL 2/2 gHTN; received Cervidil, pitocin, SROM, epidural, and progressed to complete.     PreE labs wnl, P/C 0.16    She delivered by spontaneous vaginal a Live Born infant on 24.       Information for the patient's :  James Parekh [[id number]]   female   Birth Weight: 3.62 kg (7 lb 15.7 oz)    Apgars: 8 at 1 minute and 9 at 5 minutes.     Postpartum course: normal.      Course of patient: complicated by gestational HTN    Discharge to: Home    Readmission planned: no     Indication for 6 week PP 2 hour GTT?: no     Eligible for 2 week PP virtual visit? no     Recommendations on Discharge:     Medications:      Medication List        START taking these medications      ibuprofen 600 MG tablet  Commonly known as: ADVIL;MOTRIN  Take 1 tablet by mouth every 6 hours as needed for Pain     senna 8.6 MG tablet  Commonly known as: Senokot  Take 1 tablet by mouth 2 times daily as needed for Constipation            CHANGE how you take these medications      * acetaminophen 500 MG tablet  Commonly  known as: TYLENOL  What changed: Another medication with the same name was added. Make sure you understand how and when to take each.     * acetaminophen 500 MG tablet  Commonly known as: TYLENOL  Take 2 tablets by mouth every 6 hours as needed for Pain  What changed: You were already taking a medication with the same name, and this prescription was added. Make sure you understand how and when to take each.           * This list has 2 medication(s) that are the same as other medications prescribed for you. Read the directions carefully, and ask your doctor or other care provider to review them with you.                CONTINUE taking these medications      calcium carbonate 500 MG chewable tablet  Commonly known as: TUMS     Iron 28 MG Tabs  Take 325 mg by mouth every other day     prenatal vitamin Chew               Where to Get Your Medications        These medications were sent to [facility] [address] -  [phone] - [phone]  [address] [place] [id number]      Phone: [phone]   acetaminophen 500 MG tablet  ibuprofen 600 MG tablet  senna 8.6 MG tablet         Activity: pelvic rest x 6 weeks  Diet: regular diet  Follow up: 3 days for BP check    Condition on discharge: stable    Discharge date: 11/16/24    Nida Freeman DO  Ob/Gyn Resident    Comments:  Home care and follow-up care were reviewed.  Pelvic rest, and birth control were reviewed. Signs and symptoms of mastitis and post partum depression were reviewed. The patient is to notify her physician if any of these occur. The patient was counseled on secondary smoke risks and the increased risk of sudden infant death syndrome and respiratory problems to her baby with exposure. She was counseled on various alternate recommendations to decrease the exposure to secondary smoke to her children.

## 2024-11-12 NOTE — PROGRESS NOTES
SUBJECTIVE:  Chaperone for Intimate Exam  Chaperone was offered as part of the rooming process. Patient declined and agrees to continue with exam without a chaperone.  Chaperone: n/a      Mildred is here for NST. Reports positive fetal movement. No concerns. Discussed with partner and open to trying induction again. Would like SVE      OBJECTIVE:  Blood pressure (!) 130/90, weight 92.4 kg (203 lb 9.6 oz), last menstrual period 02/02/2024, not currently breastfeeding.    Total weight gain: 13 kg (28 lb 9.6 oz)    SVE fingertip/30/-2    NST:   Baseline:  135  Variability:  Moderate  Accelerations:  Present  Decelerations: Absent   Fetal Movement:  Perceived by patient during NST  Contractions: patient denies contractions, contractions Absent  on toco.  Interpretation: Reactive (Category1)     BPP completed during appointment: No      Vaccinations:   [x]Accepted tdap/flu  []Declined   [] Undecided   [] Educated on recommendations    ASSESSMENT/PLAN:  IUP @ 40+4 weeks  S=D    High Risk Pregnancy  Due date is based on LMP and confirmed with 10w6d early dating ultrasound  Patient's prenatal labs are completed.  Patient's blood type O positive and rhogam is not indicated in the pregnancy.   Early glucola indicated: yes  Completed: Yes  Results: 60 Pass  Plan: repeat 28 weeks  Patient Accepted  genetic screening.   Accepted and cell free DNA testingand test(s) are low risk trisomy 21/18/16 (NIPT)  Anatomy scan scheduled 6/25/24 completed. Placenta anterior,normal cord insertion: Yes cervical length 3.72 cm, NO low lying placenta, no placenta previa . Additional findings: yes, non-visualization of the right fetal kidney and non-visualization of the right renal artery   USN 7/23/24 presentation cephalic. EFW 63% ANJUM WNL non-visualization of the right fetal kidney and non-visualization of the right renal artery   Fetal echo 7/23/24 WNL  USN 8/20/24 presentation cephalic. ANJUM 16.04. BPP 8/8. EFW 77% non-visualization of the right

## 2024-11-13 ENCOUNTER — ANESTHESIA EVENT (OUTPATIENT)
Dept: LABOR AND DELIVERY | Age: 29
End: 2024-11-13
Payer: COMMERCIAL

## 2024-11-13 ENCOUNTER — ANESTHESIA (OUTPATIENT)
Dept: LABOR AND DELIVERY | Age: 29
End: 2024-11-13
Payer: COMMERCIAL

## 2024-11-13 PROCEDURE — 6360000002 HC RX W HCPCS: Performed by: NURSE ANESTHETIST, CERTIFIED REGISTERED

## 2024-11-13 PROCEDURE — 2500000003 HC RX 250 WO HCPCS: Performed by: NURSE ANESTHETIST, CERTIFIED REGISTERED

## 2024-11-13 PROCEDURE — 2580000003 HC RX 258

## 2024-11-13 PROCEDURE — 3700000025 EPIDURAL BLOCK: Performed by: ANESTHESIOLOGY

## 2024-11-13 PROCEDURE — 6360000002 HC RX W HCPCS

## 2024-11-13 PROCEDURE — 6370000000 HC RX 637 (ALT 250 FOR IP)

## 2024-11-13 PROCEDURE — 1220000000 HC SEMI PRIVATE OB R&B

## 2024-11-13 RX ORDER — NALOXONE HYDROCHLORIDE 0.4 MG/ML
INJECTION, SOLUTION INTRAMUSCULAR; INTRAVENOUS; SUBCUTANEOUS PRN
Status: DISCONTINUED | OUTPATIENT
Start: 2024-11-13 | End: 2024-11-14 | Stop reason: HOSPADM

## 2024-11-13 RX ORDER — LIDOCAINE HYDROCHLORIDE 10 MG/ML
INJECTION, SOLUTION EPIDURAL; INFILTRATION; INTRACAUDAL; PERINEURAL
Status: DISCONTINUED | OUTPATIENT
Start: 2024-11-13 | End: 2024-11-14 | Stop reason: SDUPTHER

## 2024-11-13 RX ORDER — LIDOCAINE HYDROCHLORIDE AND EPINEPHRINE 15; 5 MG/ML; UG/ML
INJECTION, SOLUTION EPIDURAL
Status: DISCONTINUED | OUTPATIENT
Start: 2024-11-13 | End: 2024-11-14 | Stop reason: SDUPTHER

## 2024-11-13 RX ORDER — ROPIVACAINE HYDROCHLORIDE 2 MG/ML
INJECTION, SOLUTION EPIDURAL; INFILTRATION; PERINEURAL
Status: DISCONTINUED | OUTPATIENT
Start: 2024-11-13 | End: 2024-11-14 | Stop reason: SDUPTHER

## 2024-11-13 RX ORDER — NALBUPHINE HYDROCHLORIDE 10 MG/ML
10 INJECTION INTRAMUSCULAR; INTRAVENOUS; SUBCUTANEOUS ONCE
Status: COMPLETED | OUTPATIENT
Start: 2024-11-13 | End: 2024-11-13

## 2024-11-13 RX ORDER — ROPIVACAINE HYDROCHLORIDE 2 MG/ML
INJECTION, SOLUTION EPIDURAL; INFILTRATION; PERINEURAL
Status: COMPLETED
Start: 2024-11-13 | End: 2024-11-13

## 2024-11-13 RX ADMIN — POLYETHYLENE GLYCOL 3350 17 G: 17 POWDER, FOR SOLUTION ORAL at 14:37

## 2024-11-13 RX ADMIN — SODIUM CHLORIDE, POTASSIUM CHLORIDE, SODIUM LACTATE AND CALCIUM CHLORIDE: 600; 310; 30; 20 INJECTION, SOLUTION INTRAVENOUS at 15:54

## 2024-11-13 RX ADMIN — Medication 5 ML: at 20:19

## 2024-11-13 RX ADMIN — Medication 10 ML/HR: at 20:18

## 2024-11-13 RX ADMIN — LIDOCAINE HYDROCHLORIDE,EPINEPHRINE BITARTRATE 3 ML: 15; .005 INJECTION, SOLUTION EPIDURAL; INFILTRATION; INTRACAUDAL; PERINEURAL at 20:09

## 2024-11-13 RX ADMIN — SODIUM CHLORIDE, POTASSIUM CHLORIDE, SODIUM LACTATE AND CALCIUM CHLORIDE: 600; 310; 30; 20 INJECTION, SOLUTION INTRAVENOUS at 07:37

## 2024-11-13 RX ADMIN — NALBUPHINE HYDROCHLORIDE 10 MG: 10 INJECTION, SOLUTION INTRAMUSCULAR; INTRAVENOUS; SUBCUTANEOUS at 18:07

## 2024-11-13 RX ADMIN — Medication 1 MILLI-UNITS/MIN: at 07:39

## 2024-11-13 RX ADMIN — LIDOCAINE HYDROCHLORIDE 3 MG: 10 INJECTION, SOLUTION EPIDURAL; INFILTRATION; INTRACAUDAL; PERINEURAL at 20:06

## 2024-11-13 RX ADMIN — LIDOCAINE HYDROCHLORIDE,EPINEPHRINE BITARTRATE 2 ML: 15; .005 INJECTION, SOLUTION EPIDURAL; INFILTRATION; INTRACAUDAL; PERINEURAL at 20:10

## 2024-11-13 ASSESSMENT — PAIN DESCRIPTION - DESCRIPTORS: DESCRIPTORS: CRAMPING

## 2024-11-13 ASSESSMENT — PAIN DESCRIPTION - LOCATION: LOCATION: ABDOMEN

## 2024-11-13 ASSESSMENT — PAIN SCALES - GENERAL: PAINLEVEL_OUTOF10: 6

## 2024-11-13 NOTE — FLOWSHEET NOTE
RN at bedside. Pt states she thinks her water broke or its urine. Nitrazine positive. Dr. Singleton notified.

## 2024-11-13 NOTE — PROGRESS NOTES
Labor Progress Note    Mildred Parekh is a 29 y.o. female  at 40w5d  The patient was seen and examined. Her pain is well controlled. She reports fetal movement is present, complains of contractions, denies loss of fluid, denies vaginal bleeding.       Vital Signs:  Vitals:    24 1424   BP: (!) 134/93   Pulse: 93   Temp: 98.3 °F (36.8 °C)   TempSrc: Oral   SpO2: 98%         FHT: 130, moderate variability, accelerations present, decelerations absent  Contractions: irregular    Chaperone for Intimate Exam: Chaperone was present for entire exam, Chaperone Name: TITI Reyes  Cervical Exam: 3 cm dilated, 50 effaced, -1 station  Pitocin: N/a    Membranes: Intact  Scalp Electrode in place: absent  Intrauterine Pressure Catheter in Place: absent    Interventions: SVE    Assessment/Plan:  Mildred Parekh is a 29 y.o. female  at 40w5d admitted for IOL 2/2 gHTN (new dx)\   - GBS negative, No indication for GBS prophylaxis   - cEFM/TOCO   - s/p Cervidil   - SVE 3/50/-1   - Pitocin is ordered to start per protocol    Attending updated and in agreement with plan.    Chantale Becerra MD  Ob/Gyn Resident  2024, 6:50 AM

## 2024-11-13 NOTE — PROGRESS NOTES
Labor Progress Note    Mildred Parekh is a 29 y.o. female  at 40w5d  The patient was seen and examined. RN reports patient SROM around 1545pm and Nitrazine positive. Her pain is well controlled. She reports fetal movement is present, complains of contractions, complains of loss of fluid, denies vaginal bleeding.       Vital Signs:  Vitals:    24 1257 24 1401 24 1441 24 1557   BP: (!) 142/85 139/85 (!) 143/82 (!) 140/93   Pulse: 71 86 86 89   Resp: 20 20 20 18   Temp:       TempSrc:       SpO2:           FHT: 135, moderate variability, accelerations present, decelerations absent  Contractions: regular, every 2-3 minutes    Chaperone for Intimate Exam: Chaperone was present for entire exam, Chaperone Name: TITI Villatoro  Cervical Exam:   Pitocin: @ 16 mu/min    Membranes: Ruptured clear fluid  Scalp Electrode in place: absent  Intrauterine Pressure Catheter in Place: absent    Interventions: SVE    Assessment/Plan:  Mildred Parekh is a 29 y.o. female  at 40w5d admitted for IOL 2/2 gHTN              - GBS negative, No indication for GBS prophylaxis              - VSS, afebrile              - cEFM/TOCO              - SVE:               - SROM clear around 1545              - Continue pitocin per protocol   - Continue to monitor closely    Attending updated and in agreement with plan    Maryan Pina DO  Ob/Gyn Resident  2024, 4:34 PM

## 2024-11-13 NOTE — PROGRESS NOTES
Obstetric/Gynecology Resident Interval Note    Tracing Evaluated:    Fetal heart rate baseline: 140, moderate variability, accelerations present, absent decelerations   TOCO: intermittent    Pit @ 8 mu/min. Continue pit per protocol.    Jennie Benton MD  OB/GYN Resident, PGY3  [place], [place]  9/8/2024, 6:47 PM

## 2024-11-13 NOTE — PROGRESS NOTES
Labor Progress Note    Mildred Parekh is a 29 y.o. female  at 40w5d  The patient was seen and examined. Her pain is well controlled. She reports fetal movement is present, complains of contractions, denies loss of fluid, denies vaginal bleeding.       Vital Signs:  Vitals:    24 0902 24 1003 24 1110 24 1150   BP: 119/69 (!) 119/59 139/81 (!) 126/96   Pulse: 92 88 90 85   Resp: 16 20 20 20   Temp:    98.2 °F (36.8 °C)   TempSrc:    Oral   SpO2:           FHT: 125, moderate variability, accelerations present, decelerations absent  Contractions: irregular, every 3-4 minutes difficult to  in certain positions    Chaperone for Intimate Exam: Chaperone was present for entire exam, Chaperone Name: TITI Gallagher  Cervical Exam: 3/50/-2  Pitocin: @ 8 mu/min    Membranes: Intact  Scalp Electrode in place: absent  Intrauterine Pressure Catheter in Place: absent    Interventions: SVE    Assessment/Plan:  Mildred Parekh is a 29 y.o. female  at 40w5d admitted for IOL 2/2 gHTN   - GBS negative, No indication for GBS prophylaxis   - VSS, afebrile   - cEFM/TOCO   - SVE: 3/50/-2   - Discussed AROM with patient when fetal head descends further into the pelvis. Cervix tight 3cm and thick, mentioned use of hassan balloon for further labor augmentation and patient considering. Patient is not ready for differing augmentation at this time   - Plan to re-assess in a few hours or if patient calls out with more pressure   - Continue pitocin per protocol      Attending updated and in agreement with plan    Maryan Pina DO  Ob/Gyn Resident  2024, 12:59 PM

## 2024-11-14 PROCEDURE — 1220000000 HC SEMI PRIVATE OB R&B

## 2024-11-14 PROCEDURE — 59400 OBSTETRICAL CARE: CPT | Performed by: OBSTETRICS & GYNECOLOGY

## 2024-11-14 PROCEDURE — 6360000002 HC RX W HCPCS

## 2024-11-14 PROCEDURE — 2500000003 HC RX 250 WO HCPCS

## 2024-11-14 PROCEDURE — 6370000000 HC RX 637 (ALT 250 FOR IP)

## 2024-11-14 PROCEDURE — 0KQM0ZZ REPAIR PERINEUM MUSCLE, OPEN APPROACH: ICD-10-PCS | Performed by: OBSTETRICS & GYNECOLOGY

## 2024-11-14 PROCEDURE — 2580000003 HC RX 258

## 2024-11-14 PROCEDURE — 88307 TISSUE EXAM BY PATHOLOGIST: CPT

## 2024-11-14 PROCEDURE — 7200000001 HC VAGINAL DELIVERY

## 2024-11-14 PROCEDURE — 6360000002 HC RX W HCPCS: Performed by: OBSTETRICS & GYNECOLOGY

## 2024-11-14 RX ORDER — LIDOCAINE HYDROCHLORIDE 10 MG/ML
INJECTION, SOLUTION INFILTRATION; PERINEURAL
Status: COMPLETED
Start: 2024-11-14 | End: 2024-11-14

## 2024-11-14 RX ORDER — LANOLIN
CREAM (ML) TOPICAL PRN
Status: DISCONTINUED | OUTPATIENT
Start: 2024-11-14 | End: 2024-11-15 | Stop reason: HOSPADM

## 2024-11-14 RX ORDER — IBUPROFEN 600 MG/1
600 TABLET, FILM COATED ORAL EVERY 6 HOURS PRN
Qty: 90 EACH | Refills: 1 | Status: SHIPPED | OUTPATIENT
Start: 2024-11-14 | End: 2025-01-13

## 2024-11-14 RX ORDER — ONDANSETRON 2 MG/ML
4 INJECTION INTRAMUSCULAR; INTRAVENOUS EVERY 6 HOURS PRN
Status: DISCONTINUED | OUTPATIENT
Start: 2024-11-14 | End: 2024-11-14 | Stop reason: SDUPTHER

## 2024-11-14 RX ORDER — ONDANSETRON 2 MG/ML
4 INJECTION INTRAMUSCULAR; INTRAVENOUS EVERY 6 HOURS PRN
Status: DISCONTINUED | OUTPATIENT
Start: 2024-11-14 | End: 2024-11-15 | Stop reason: HOSPADM

## 2024-11-14 RX ORDER — IBUPROFEN 600 MG/1
600 TABLET, FILM COATED ORAL EVERY 6 HOURS
Status: DISCONTINUED | OUTPATIENT
Start: 2024-11-14 | End: 2024-11-15 | Stop reason: HOSPADM

## 2024-11-14 RX ORDER — ACETAMINOPHEN 500 MG
1000 TABLET ORAL EVERY 6 HOURS
Status: DISCONTINUED | OUTPATIENT
Start: 2024-11-14 | End: 2024-11-15 | Stop reason: HOSPADM

## 2024-11-14 RX ORDER — SODIUM CHLORIDE 0.9 % (FLUSH) 0.9 %
5-40 SYRINGE (ML) INJECTION EVERY 12 HOURS SCHEDULED
Status: DISCONTINUED | OUTPATIENT
Start: 2024-11-14 | End: 2024-11-15 | Stop reason: HOSPADM

## 2024-11-14 RX ORDER — ONDANSETRON 2 MG/ML
INJECTION INTRAMUSCULAR; INTRAVENOUS
Status: COMPLETED
Start: 2024-11-14 | End: 2024-11-14

## 2024-11-14 RX ORDER — SODIUM CHLORIDE 0.9 % (FLUSH) 0.9 %
5-40 SYRINGE (ML) INJECTION PRN
Status: DISCONTINUED | OUTPATIENT
Start: 2024-11-14 | End: 2024-11-15 | Stop reason: HOSPADM

## 2024-11-14 RX ORDER — KETOROLAC TROMETHAMINE 30 MG/ML
30 INJECTION, SOLUTION INTRAMUSCULAR; INTRAVENOUS ONCE
Status: COMPLETED | OUTPATIENT
Start: 2024-11-14 | End: 2024-11-14

## 2024-11-14 RX ORDER — SIMETHICONE 80 MG
80 TABLET,CHEWABLE ORAL EVERY 6 HOURS PRN
Status: DISCONTINUED | OUTPATIENT
Start: 2024-11-14 | End: 2024-11-15 | Stop reason: HOSPADM

## 2024-11-14 RX ORDER — SENNA AND DOCUSATE SODIUM 50; 8.6 MG/1; MG/1
1 TABLET, FILM COATED ORAL 2 TIMES DAILY
Status: DISCONTINUED | OUTPATIENT
Start: 2024-11-14 | End: 2024-11-15 | Stop reason: HOSPADM

## 2024-11-14 RX ORDER — ONDANSETRON 4 MG/1
4 TABLET, ORALLY DISINTEGRATING ORAL EVERY 6 HOURS PRN
Status: DISCONTINUED | OUTPATIENT
Start: 2024-11-14 | End: 2024-11-15 | Stop reason: HOSPADM

## 2024-11-14 RX ORDER — SENNOSIDES A AND B 8.6 MG/1
1 TABLET, FILM COATED ORAL 2 TIMES DAILY PRN
Qty: 60 TABLET | Refills: 0 | Status: SHIPPED | OUTPATIENT
Start: 2024-11-14 | End: 2024-12-14

## 2024-11-14 RX ORDER — POLYETHYLENE GLYCOL 3350 17 G/17G
17 POWDER, FOR SOLUTION ORAL DAILY PRN
Status: DISCONTINUED | OUTPATIENT
Start: 2024-11-14 | End: 2024-11-15 | Stop reason: HOSPADM

## 2024-11-14 RX ORDER — SODIUM CHLORIDE 9 MG/ML
INJECTION, SOLUTION INTRAVENOUS PRN
Status: DISCONTINUED | OUTPATIENT
Start: 2024-11-14 | End: 2024-11-15 | Stop reason: HOSPADM

## 2024-11-14 RX ORDER — MISOPROSTOL 100 UG/1
400 TABLET ORAL PRN
Status: DISCONTINUED | OUTPATIENT
Start: 2024-11-14 | End: 2024-11-15 | Stop reason: HOSPADM

## 2024-11-14 RX ORDER — ACETAMINOPHEN 500 MG
1000 TABLET ORAL EVERY 6 HOURS PRN
Qty: 120 TABLET | Refills: 1 | Status: SHIPPED | OUTPATIENT
Start: 2024-11-14

## 2024-11-14 RX ORDER — BISACODYL 10 MG
10 SUPPOSITORY, RECTAL RECTAL DAILY PRN
Status: DISCONTINUED | OUTPATIENT
Start: 2024-11-14 | End: 2024-11-15 | Stop reason: HOSPADM

## 2024-11-14 RX ORDER — ONDANSETRON 4 MG/1
4 TABLET, ORALLY DISINTEGRATING ORAL EVERY 6 HOURS PRN
Status: DISCONTINUED | OUTPATIENT
Start: 2024-11-14 | End: 2024-11-14 | Stop reason: SDUPTHER

## 2024-11-14 RX ADMIN — ACETAMINOPHEN 1000 MG: 500 TABLET ORAL at 06:15

## 2024-11-14 RX ADMIN — IBUPROFEN 600 MG: 600 TABLET, FILM COATED ORAL at 16:22

## 2024-11-14 RX ADMIN — ACETAMINOPHEN 1000 MG: 500 TABLET ORAL at 13:03

## 2024-11-14 RX ADMIN — KETOROLAC TROMETHAMINE 30 MG: 30 INJECTION, SOLUTION INTRAMUSCULAR; INTRAVENOUS at 03:56

## 2024-11-14 RX ADMIN — IBUPROFEN 600 MG: 600 TABLET, FILM COATED ORAL at 10:17

## 2024-11-14 RX ADMIN — SODIUM CHLORIDE, PRESERVATIVE FREE 10 ML: 5 INJECTION INTRAVENOUS at 22:42

## 2024-11-14 RX ADMIN — ACETAMINOPHEN 1000 MG: 500 TABLET ORAL at 19:11

## 2024-11-14 RX ADMIN — SENNOSIDES AND DOCUSATE SODIUM 1 TABLET: 50; 8.6 TABLET ORAL at 22:42

## 2024-11-14 RX ADMIN — Medication 87.3 MILLI-UNITS/MIN: at 03:25

## 2024-11-14 RX ADMIN — LIDOCAINE HYDROCHLORIDE: 10 INJECTION, SOLUTION INFILTRATION; PERINEURAL at 03:25

## 2024-11-14 RX ADMIN — IBUPROFEN 600 MG: 600 TABLET, FILM COATED ORAL at 22:42

## 2024-11-14 RX ADMIN — SENNOSIDES AND DOCUSATE SODIUM 1 TABLET: 50; 8.6 TABLET ORAL at 10:17

## 2024-11-14 RX ADMIN — ONDANSETRON 4 MG: 2 INJECTION INTRAMUSCULAR; INTRAVENOUS at 02:24

## 2024-11-14 ASSESSMENT — PAIN SCALES - GENERAL
PAINLEVEL_OUTOF10: 2
PAINLEVEL_OUTOF10: 1
PAINLEVEL_OUTOF10: 2
PAINLEVEL_OUTOF10: 2

## 2024-11-14 ASSESSMENT — PAIN DESCRIPTION - ORIENTATION
ORIENTATION: LOWER
ORIENTATION: LOWER

## 2024-11-14 ASSESSMENT — PAIN DESCRIPTION - DESCRIPTORS
DESCRIPTORS: ACHING;DISCOMFORT
DESCRIPTORS: DISCOMFORT

## 2024-11-14 ASSESSMENT — PAIN DESCRIPTION - LOCATION
LOCATION: ABDOMEN
LOCATION: ABDOMEN

## 2024-11-14 NOTE — PROGRESS NOTES
Obstetric/Gynecology Resident Interval Note    Tracing is reviewed.    Fetal Heart Monitor:  Baseline Heart Rate 130, moderate variability, present accelerations, absent decelerations  Oppelo: contractions, regular, every 2-4 minutes    Pitocin @ 18 mckenna-units/min     Epidural in place. Continue to monitor closely.     Chantale Becerra MD  Ob/Gyn Resident PGY2  [facility]  11/13/2024, 11:16 PM

## 2024-11-14 NOTE — CONSULTS
Breastfeeding packet given and reviewed, pt states baby nursed 10-15 minutes off and on in recovery. Currently skin to skin and sleepy. Encouraged frequent attempts, hand expression, and to call out for assistance as needed.

## 2024-11-14 NOTE — PROGRESS NOTES
Obstetric/Gynecology Resident Interval Note    Patient feeling more pressure and SVE reveals completed and -1 station. Plan to position in high fowlers for fetal descent. Dr. Sandra juarez.    Chantale Becerra MD  OB/GYN Resident, PGY2  [place], [place]  11/14/2024, 12:10 AM

## 2024-11-14 NOTE — FLOWSHEET NOTE
Patient admitted to room 747 from L&D via wheelchair. Oriented to room and surroundings. Plan of care reviewed. Verbalized understanding.  Instructed on infant security and safe sleep practices.  Preventing falls education provided .The following handouts given: A New Beginning: Your Guide to Postpartum Care, Rounding, gs Security System,Babies Cry A lot, Safe Sleep, Security and Visitation Guidelines. Call light placed within reach.

## 2024-11-14 NOTE — ANESTHESIA PRE PROCEDURE
Department of Anesthesiology  Preprocedure Note       Name:  Mildred Parekh   Age:  29 y.o.  :  1995                                          MRN:  [id number]         Date:  2024      Surgeon: * No surgeons listed *    Procedure: * No procedures listed *    Medications prior to admission:   Prior to Admission medications    Medication Sig Start Date End Date Taking? Authorizing Provider   Ferrous Sulfate (IRON) 28 MG TABS Take 325 mg by mouth every other day 24   Michelle Mahmood DO   calcium carbonate (TUMS) 500 MG chewable tablet Take 1 tablet by mouth daily    ProviderCharley MD   acetaminophen (TYLENOL) 500 MG tablet Take 1 tablet by mouth every 6 hours as needed for Pain    Charley Davis MD   Prenatal Vit-Fe Fumarate-FA (PRENATAL VITAMIN) CHEW Take by mouth    ProviderCharley MD       Current medications:    Current Facility-Administered Medications   Medication Dose Route Frequency Provider Last Rate Last Admin    oxytocin (PITOCIN) 30 units in 500 mL infusion  1-20 mckenna-units/min IntraVENous Continuous Chantale Becerra MD 18 mL/hr at 24 1906 18 mckenna-units/min at 24 1906    naloxone 0.4 mg in 10 mL sodium chloride syringe   IntraVENous PRN Rolan Hernandez MD        ROPivacaine 0.2% in sodium chloride 0.9% (OB) epidural 100 mL  10 mL/hr Epidural Continuous Rolan Hernandez MD        ROPivacaine (NAROPIN) 0.2% injection 0.2%             lactated ringers infusion   IntraVENous Continuous Maryan Pina  mL/hr at 24 1610 Rate Verify at 24 1610    lactated ringers bolus 500 mL  500 mL IntraVENous PRN Maryan Pina DO        Or    lactated ringers bolus 1,000 mL  1,000 mL IntraVENous PRN Maryan Pina DO        sodium chloride flush 0.9 % injection 5-40 mL  5-40 mL IntraVENous 2 times per day Maryan Pina DO        sodium chloride flush 0.9 % injection 5-40 mL  5-40 mL IntraVENous PRN Maryan Pina,         0.9 % sodium chloride

## 2024-11-14 NOTE — CARE COORDINATION
Social Work     Sw reviewed medical record (current active problem list) and tox screens and found no current concerns.     Sw spoke with mom and dad briefly to explain Sw role, inquire if any needs or concerns, and provide safe sleep education and discuss.  Mom denied any needs or questions and informs baby has a safe sleep environment (bass).     Mom denied any current s/s of anxiety or depression and is aware to reach out to OB if any s/s occur after dc.     Mom reports a really good support system with family, and denied any current questions or needs.      Mom reports this is her 1st baby.       Mom states ped will be [facility].      Sw encouraged parents to reach out if any issues or concerns arise.

## 2024-11-14 NOTE — FLOWSHEET NOTE
Patient up to restroom with RN. Ambulated well, voiding, ice/tucks/spray in place. Returned to bed. Call light within reach. Tolerated well.

## 2024-11-14 NOTE — L&D DELIVERY NOTE
James Parekh [[id number]]      Labor Events     Labor: No  Cervical Ripening Date/Time:      Antibiotics Received during Labor: No  Rupture Identifier: Sac 1  Rupture Date/Time:  24 15:40:00   Rupture Type: SROM  Fluid Color: Clear, Bloody Show  Fluid Odor: None  Fluid Volume: Moderate  Induction: Cervidil, Oxytocin  Augmentation: Oxytocin, None       Anesthesia    Method: Epidural       Labor Event Times      Labor onset date/time:  24 22:15:00     Dilation complete date/time:  24 23:34:00     Start pushing date/time:  2024 00:46:00   Decision date/time (emergent ):            Delivery Details      Delivery Date: 24 Delivery Time: 03:19:00                 Cord                  Placenta           Lacerations           Vaginal Counts    Initial Count Personnel: NORA  Initial Count Verified By: MARCELO  Intial Sponge Count: Correct  Intial Instruments Count: Correct          Blood Loss  Mother: Mildred Parekh #[id number]     Start of Mother's Information      Delivery Blood Loss   Intrapartum & Postpartum: 24 2215 - 24 0356    Delivery Admission: 24 1357 - 24 0356         Intrapartum & Postpartum Delivery Admission    None                  End of Mother's Information  Mother: Mildred Parekh #[id number]                Delivery Providers    Delivering clinician: Demetris Flores DO     Provider Role    Demetris Flores DO Obstetrician    Pat Cunningham RN Primary Nurse     Primary [place] Nurse     NICU Nurse     Neonatologist     Anesthesiologist     Nurse Anesthetist     Nurse Practitioner     Midwife     Nursery Nurse     Respiratory Therapist     Scrub Tech     Assistant Surgeon    Chantale Becerra MD Resident               Assessment          Skin Color:   Heart Rate:   Reflex Irritability:   Muscle Tone:   Respiratory Effort:   Total:            1 Minute:         5 Minute:                                                   Measurements      Birth Weight: 3620 g   Birth Length: 52.1 cm                Vaginal Delivery Note  Department of Obstetrics and Gynecology  [facility]       Patient: Mildred Parekh   : 1995  MRN: [id number]   Date of delivery: 24     Pre-operative Diagnosis: Mildred aPrekh  at 40w6d  Induction of labor due to gestational hypertension   Suspected Right renal agenesis of fetus  Fetal anomalies (NIPT low risk)  Family history of Down Syndrome  BMI 34    Post-operative Diagnosis:  [place] living infant female  Induction of labor due to gestational hypertension   Suspected Right renal agenesis of fetus  Fetal anomalies (NIPT low risk)  Family history of Down Syndrome  BMI 34    Delivering Obstetrician & Assistant(s): Dr. Flores; Chantale Becerra MD, PGY2; Mildred Young DO, PGY4    Infant Information:   Information for the patient's :  James Parekh [[id number]]      Information for the patient's :  James Parekh [[id number]]        Apgar scores: 8 at 1 minute and 9 at 5 minutes.     Anesthesia:  epidural anesthesia    Application and Delivery:    She was known to be GBS negative.    The patient progressed well, received an epidural, became complete and felt the urge to push. After pushing with contractions the fetal head delivered Cephalic, right occiput anterior over an intact perineum, nuchal cord was present and reduced.  The anterior, then posterior shoulder delivered easily and atraumatically followed by the rest of the infant. Nose and mouth suctioned with bulb suction, infant was stimulated and dried. Cord was clamped and cut after one minute delayed cord clamping and infant was placed on maternal abdomen, and attended by RN for evaluation. The delivery of the placenta was spontaneous and appeared intact, whole, and that the umbilical cord had three vessels noted. Pitocin was started. The vagina was swept of all clots and debris. The perineum and vagina were evaluated

## 2024-11-14 NOTE — LACTATION NOTE
Called in to assist with feed, placed baby skin to skin, reviewed different positioning and taught hand expression. Baby licking around, not interested in latching at this time but did hand express a few drops into baby's mouth.

## 2024-11-14 NOTE — CARE COORDINATION
CASE MANAGEMENT POST-PARTUM TRANSITIONAL CARE PLAN    40 weeks gestation of pregnancy [Z3A.40]    OB Provider: Dr Sandra Lathamr met w/ Mildred at her bedside to discuss DCP. She is S/P  on 2024    Writer verified address/phone number correct on facesheet. She states she lives with her  Jacob and his brother and now baby. She denied barriers with transportation home, to doctor's appointments or with paying for medications upon discharge home.     [employer] insurance correct. Writer notified mom she has 30 days from date of birth to add  to insurance policy. She states both she and dad will likely add baby to each policy.  His name is Jacob Parekh, BD: 1995. Mildred verbalized understanding.    Infant name on BC: Denisefede Parekh.   Infant PCP undecided. List given.     DME: has BP cuff @ home for gHTN.   HOME CARE: no    Anticipated DC of mother and infant 1-2 days after .     Readmission Risk              Risk of Unplanned Readmission:  5

## 2024-11-14 NOTE — ANESTHESIA PROCEDURE NOTES
Epidural Block    Patient location during procedure: OB  Reason for block: labor epidural  Staffing  Performed: resident/CRNA   Anesthesiologist: Rolan Hernandez MD  Resident/CRNA: Karolyn Delgado APRN - CRNA  Performed by: Karolyn Delgado APRN - CRNA  Authorized by: Rolan Hernandez MD    Epidural  Patient position: sitting  Prep: Betadine and site prepped and draped  Patient monitoring: continuous pulse ox and frequent blood pressure checks  Approach: midline  Location: L2-3  Injection technique: FABIO saline  Provider prep: mask and sterile gloves  Needle  Needle type: Tuohy   Needle gauge: 17 G  Needle length: 3.5 in  Needle insertion depth: 7.5 cm  Catheter type: side hole  Catheter size: 19 G  Catheter at skin depth: 15 cm  Test dose: negativeCatheter Secured: tegaderm and tape  Assessment  Hemodynamics: stable  Attempts: 1  Outcomes: uncomplicated and patient tolerated procedure well  Preanesthetic Checklist  Completed: patient identified, IV checked, site marked, risks and benefits discussed, surgical/procedural consents, equipment checked, pre-op evaluation, timeout performed, anesthesia consent given, oxygen available, monitors applied/VS acknowledged, fire risk safety assessment completed and verbalized and blood product R/B/A discussed and consented

## 2024-11-15 VITALS
TEMPERATURE: 98 F | DIASTOLIC BLOOD PRESSURE: 85 MMHG | SYSTOLIC BLOOD PRESSURE: 128 MMHG | OXYGEN SATURATION: 99 % | HEART RATE: 81 BPM | RESPIRATION RATE: 18 BRPM

## 2024-11-15 PROCEDURE — 2580000003 HC RX 258

## 2024-11-15 PROCEDURE — 6370000000 HC RX 637 (ALT 250 FOR IP)

## 2024-11-15 PROCEDURE — 99024 POSTOP FOLLOW-UP VISIT: CPT | Performed by: OBSTETRICS & GYNECOLOGY

## 2024-11-15 RX ADMIN — IBUPROFEN 600 MG: 600 TABLET, FILM COATED ORAL at 06:09

## 2024-11-15 RX ADMIN — ACETAMINOPHEN 1000 MG: 500 TABLET ORAL at 02:45

## 2024-11-15 RX ADMIN — IBUPROFEN 600 MG: 600 TABLET, FILM COATED ORAL at 16:49

## 2024-11-15 RX ADMIN — ACETAMINOPHEN 1000 MG: 500 TABLET ORAL at 16:49

## 2024-11-15 RX ADMIN — SENNOSIDES AND DOCUSATE SODIUM 1 TABLET: 50; 8.6 TABLET ORAL at 08:50

## 2024-11-15 RX ADMIN — SODIUM CHLORIDE, PRESERVATIVE FREE 10 ML: 5 INJECTION INTRAVENOUS at 08:51

## 2024-11-15 RX ADMIN — ACETAMINOPHEN 1000 MG: 500 TABLET ORAL at 08:49

## 2024-11-15 ASSESSMENT — PAIN SCALES - GENERAL
PAINLEVEL_OUTOF10: 1
PAINLEVEL_OUTOF10: 1
PAINLEVEL_OUTOF10: 2
PAINLEVEL_OUTOF10: 1
PAINLEVEL_OUTOF10: 1

## 2024-11-15 ASSESSMENT — PAIN DESCRIPTION - ONSET
ONSET: ON-GOING
ONSET: ON-GOING

## 2024-11-15 ASSESSMENT — PAIN DESCRIPTION - LOCATION
LOCATION: PERINEUM
LOCATION: ABDOMEN
LOCATION: PERINEUM

## 2024-11-15 ASSESSMENT — PAIN DESCRIPTION - PAIN TYPE
TYPE: ACUTE PAIN
TYPE: ACUTE PAIN

## 2024-11-15 ASSESSMENT — PAIN DESCRIPTION - ORIENTATION
ORIENTATION: MID
ORIENTATION: MID
ORIENTATION: MID;LOWER

## 2024-11-15 ASSESSMENT — PAIN DESCRIPTION - FREQUENCY
FREQUENCY: INTERMITTENT
FREQUENCY: INTERMITTENT

## 2024-11-15 ASSESSMENT — PAIN - FUNCTIONAL ASSESSMENT
PAIN_FUNCTIONAL_ASSESSMENT: ACTIVITIES ARE NOT PREVENTED
PAIN_FUNCTIONAL_ASSESSMENT: ACTIVITIES ARE NOT PREVENTED

## 2024-11-15 ASSESSMENT — PAIN DESCRIPTION - DESCRIPTORS
DESCRIPTORS: CRAMPING
DESCRIPTORS: ACHING
DESCRIPTORS: ACHING

## 2024-11-15 NOTE — PLAN OF CARE
Problem: Postpartum  Goal: Experiences normal postpartum course  Description:  Postpartum OB-Pregnancy care plan goal which identifies if the mother is experiencing a normal postpartum course  Outcome: Adequate for Discharge  Goal: Appropriate maternal -  bonding  Description:  Postpartum OB-Pregnancy care plan goal which identifies if the mother and  are bonding appropriately  Outcome: Adequate for Discharge  Goal: Establishment of infant feeding pattern  Description:  Postpartum OB-Pregnancy care plan goal which identifies if the mother is establishing a feeding pattern with their   Outcome: Adequate for Discharge  Goal: Incisions, wounds, or drain sites healing without S/S of infection  Outcome: Adequate for Discharge     Problem: Pain  Goal: Verbalizes/displays adequate comfort level or baseline comfort level  Outcome: Adequate for Discharge

## 2024-11-15 NOTE — PROGRESS NOTES
CLINICAL PHARMACY NOTE: MEDS TO BEDS    Total # of Prescriptions Filled: 3   The following medications were delivered to the patient:  Acetaminophen 500mg tabs  Senna 8.6mg tabs  Ibuprofen 600mg tabs    Additional Documentation:  Delivered to pt + 1 in room 747 on 11/15 at 10:14A. Copay was $14.83 paid with [email or web address]

## 2024-11-15 NOTE — LACTATION NOTE
Pt called writer in to assist, baby sleepy and not latching. Per mother, baby was just deep suctioned in the nursery due to large mucous bolus mixed w colostrum. Reassured and encouraged pt to do skin to skin and call out for assistance as needed.

## 2024-11-15 NOTE — FLOWSHEET NOTE
Discharge instructions given, all questions answered.  I have reviewed all [facility] Post-Birth Warning Signs and essential teaching points for pulmonary embolism, cardiac disease, hypertensive disorders of pregnancy, obstetric hemorrhage, venous thromboembolism, infection, and postpartum depression with the patient and Jacob (support person) . I have informed the patient on when to call their healthcare provider and when to call 911. I have discussed with the patient  the importance of scheduling a follow-up visit with their physician, nurse practitioner or midwife and provided them with correct contact information for appointment. I have provided the patient with a copy of the \"Save Your Life\" handout. The patient has acknowledged receiving and understanding this education with her signature.

## 2024-11-15 NOTE — LACTATION NOTE
Pt states nursing is going very well, reviewed frequency of feedings, how to know if baby is getting enough at breast, signs of milk transfer and hunger cues. Reviewed with pt when to pump if needed. Also reviewed when and how to introduce a bottle if she desires as she inquired. Encouraged pt to call out for assistance latching as needed.

## 2024-11-15 NOTE — LACTATION NOTE
Mother attempted to latch again, baby starts suckling and falls asleep in less than a minute. Pt plans to eat lunch and writer strongly encouraged hand expression of colostrum to give to baby since it has been more than 4 hours since last feed. Hand expression supplies and education given. Syringes, spoons and colostrum collectors given.

## 2024-11-15 NOTE — PROGRESS NOTES
POST PARTUM DAY # 1    Mildred Parekh is a 29 y.o. female  This patient was seen & examined today.  on 11/15/24    Her pregnancy was complicated by:   Patient Active Problem List   Diagnosis    Renal agenesis of fetus affecting antepartum care of mother, fetus 1    39 weeks gestation of pregnancy    40 weeks gestation of pregnancy     23 F Apg 8/9 Wt 7#15       Today she is doing well without any chief complaint this morning and is sitting in chair doing skin to skin. Her lochia is light. She denies chest pain, shortness of breath, headache, and blurred vision. She is breast feeding and she denies any breast tenderness. She is ambulating well. Her voiding pattern is normal. I reviewed signs and symptoms of post partum depression with the patient, she currently denies any of these symptoms. She is tolerating solids.     Vital Signs:  Vitals:    24 1000 24 1400 24 2039 11/15/24 0420   BP: (!) 140/87 132/81 135/88 (!) 140/83   Pulse: 85 88 90 88   Resp: 16 16 16 16   Temp: 98.6 °F (37 °C) 98.6 °F (37 °C) 98.2 °F (36.8 °C) 98.4 °F (36.9 °C)   TempSrc: Oral Oral Oral Oral   SpO2:  99% 100%          Physical Exam:  General:  no apparent distress, alert, and cooperative  Neurologic:  alert, oriented, normal speech, no focal findings or movement disorder noted  Lungs:  no increased work of breathing, no conversational dyspnea  Heart:  regular rate   Abdomen: abdomen soft, non-distended, appropriately tender  Fundus: non-tender, normal size, firm, below umbilicus  Extremities:  no calf tenderness, non edematous    Lab:  Lab Results   Component Value Date    HGB 11.7 (L) 2024     Lab Results   Component Value Date    HCT 35.3 (L) 2024       Assessment/Plan:  Mildred Parekh is a  PPD # 1 s/p    - Doing well   - Female infant in General Care Nursery   - Encourage ambulation   - Postpartum Hgb/Hct if symptomatic   - Pain control: Motrin/Tylenol    Rh positive/Rubella immune   -

## 2024-11-18 NOTE — ANESTHESIA POSTPROCEDURE EVALUATION
Department of Anesthesiology  Postprocedure Note    Patient: Mildred Parekh  MRN: [id number]  YOB: 1995  Date of evaluation: 11/18/2024    Procedure Summary       Date: 11/13/24 Room / Location:     Anesthesia Start: 1948 Anesthesia Stop: 11/14/24 0319    Procedure: Labor Analgesia Diagnosis:     Scheduled Providers:  Responsible Provider: Rolan Hernandez MD    Anesthesia Type: epidural ASA Status: 2            Anesthesia Type: No value filed.    Sukumar Phase I:      Sukumar Phase II:      Anesthesia Post Evaluation    Patient location during evaluation: bedside  Patient participation: complete - patient participated  Level of consciousness: awake and alert  Airway patency: patent  Nausea & Vomiting: no nausea and no vomiting  Cardiovascular status: blood pressure returned to baseline  Respiratory status: acceptable  Hydration status: euvolemic  Pain management: adequate    No notable events documented.

## 2024-11-19 LAB — SURGICAL PATHOLOGY REPORT: NORMAL

## 2024-11-21 ENCOUNTER — TELEPHONE (OUTPATIENT)
Dept: OBGYN CLINIC | Age: 29
End: 2024-11-21

## 2024-11-21 NOTE — TELEPHONE ENCOUNTER
Her hemoglobin at delivery was 11.7    This is very close to normal range which is 11.9.  I would say she can stop her additional iron supplement at this point.  This may help some with her constipation.  She could switch her prenatal vitamin to a multivitamin to if she would like.  She should make sure she is drinking plenty of fluids especially if she is breast-feeding also and she can take that senna tablet twice daily if needed.

## 2024-11-21 NOTE — TELEPHONE ENCOUNTER
PT STATED SHE IS TAKING HER PNV WITH IRON- AND THE IRON PILL EVERY OTHER DAY- SHE IS NOTICING ON AND OFF CONSTIPATION- IF SHE DOES HAVE BOWEL MOVEMENT NOT A LOT COMING ABOUT AND CAN FEEL SHE IS BACK UP-     SHE IS TAKING THE SENNA THAT THEY GAVE IN THE HOSPITAL- SHE STATED SHE IS DRINKING LOTS OF WATER - UNSURE WHAT ELSE SHE CAN DO TO HELP

## 2024-12-05 ENCOUNTER — POSTPARTUM VISIT (OUTPATIENT)
Dept: OBGYN CLINIC | Age: 29
End: 2024-12-05

## 2024-12-05 VITALS — SYSTOLIC BLOOD PRESSURE: 128 MMHG | DIASTOLIC BLOOD PRESSURE: 88 MMHG | WEIGHT: 186.4 LBS | BODY MASS INDEX: 32 KG/M2

## 2024-12-05 PROCEDURE — 99024 POSTOP FOLLOW-UP VISIT: CPT | Performed by: NURSE PRACTITIONER

## 2024-12-05 ASSESSMENT — ANXIETY QUESTIONNAIRES
2. NOT BEING ABLE TO STOP OR CONTROL WORRYING: MORE THAN HALF THE DAYS
GAD7 TOTAL SCORE: 9
1. FEELING NERVOUS, ANXIOUS, OR ON EDGE: SEVERAL DAYS
3. WORRYING TOO MUCH ABOUT DIFFERENT THINGS: MORE THAN HALF THE DAYS
6. BECOMING EASILY ANNOYED OR IRRITABLE: SEVERAL DAYS
7. FEELING AFRAID AS IF SOMETHING AWFUL MIGHT HAPPEN: MORE THAN HALF THE DAYS
5. BEING SO RESTLESS THAT IT IS HARD TO SIT STILL: NOT AT ALL
4. TROUBLE RELAXING: SEVERAL DAYS
IF YOU CHECKED OFF ANY PROBLEMS ON THIS QUESTIONNAIRE, HOW DIFFICULT HAVE THESE PROBLEMS MADE IT FOR YOU TO DO YOUR WORK, TAKE CARE OF THINGS AT HOME, OR GET ALONG WITH OTHER PEOPLE: SOMEWHAT DIFFICULT

## 2024-12-05 NOTE — PROGRESS NOTES
[facility] [address]  [address]  [facility] [address]  Dept: [phone]  Dept Fax: [phone]    Mildred Parekh is a 29 y.o. female who presents today for her medical conditions/complaintsas noted below.  Mildred Parekh is c/o of Postpartum Care        HPI:     Mildred presents for 3 week postpartum check up. Delivered Vaginally on 24     Laceration(s): Second degree laceration.  Suture used for repair:  Vicryl 2.0.   No complaints, of chest pain, S.O.B. Headaches, no abdominal pain, GI or  issues.  Breastfeeding Yes Signs mastitis No  Bleeding Yes light lochia  Birth control options abstinence until 6 weeks then unsure     The patient completed :   E.P.D.S. Evaluation form and scored 12.   TERENCE 7 and scored 9.  Denies any  suicidal/homicidal ideations or plans or delusions or hallucinations.    She states has good support but her  has returned to work so that has been difficult at times but she states still feels as if they are getting into a routine.    Last HGB:11.7 on 24  GDM:No  gHTN or Preeclampsia: gHTN    OB History    Para Term  AB Living   1 1 1     1   SAB IAB Ectopic Molar Multiple Live Births           0 1      # Outcome Date GA Lbr Inderjit/2nd Weight Sex Type Anes PTL Lv   1 Term 24 40w6d 01:19 / 03:45 3.62 kg (7 lb 15.7 oz) F Vag-Spont EPI N GILMAR       Past Medical History:   Diagnosis Date    Anemia       Past Surgical History:   Procedure Laterality Date    WISDOM TOOTH EXTRACTION         Family History   Problem Relation Age of Onset    Down Syndrome Sister     Diabetes Paternal Grandfather        Social History     Tobacco Use    Smoking status: Never    Smokeless tobacco: Never   Substance Use Topics    Alcohol use: Not Currently      Current Outpatient Medications   Medication Sig Dispense Refill    acetaminophen (TYLENOL) 500 MG tablet Take 2 tablets by mouth every 6 hours as needed for

## 2024-12-09 ASSESSMENT — ENCOUNTER SYMPTOMS
SHORTNESS OF BREATH: 0
CONSTIPATION: 0
NAUSEA: 0
COUGH: 0
DIARRHEA: 0
VOMITING: 0
COLOR CHANGE: 0

## 2024-12-20 ENCOUNTER — TELEPHONE (OUTPATIENT)
Dept: OBGYN CLINIC | Age: 29
End: 2024-12-20

## 2024-12-20 DIAGNOSIS — N61.0 MASTITIS: Primary | ICD-10-CM

## 2024-12-20 RX ORDER — CEPHALEXIN 500 MG/1
500 CAPSULE ORAL 4 TIMES DAILY
Qty: 28 CAPSULE | Refills: 0 | Status: SHIPPED | OUTPATIENT
Start: 2024-12-20 | End: 2024-12-27

## 2024-12-20 NOTE — TELEPHONE ENCOUNTER
Has a hard red spot on right breast, warm to touch. No fever, chills. When baby feeds, swelling does go down. Patient is beginning to feel slightly achy. Patient is using warm compress, massage, tylenol/ibuprofen. Patients pharmacy is up to date in chart.

## 2024-12-20 NOTE — TELEPHONE ENCOUNTER
Meds sent. Can continue to monitor or start meds. Recommendations to start meds with fever/flu like symptoms. Sounds like she is starting to get flu like symptoms. Given weekend I wanted her to have access to meds. If starts meds and no improvements needs appointment monday

## 2025-01-02 ENCOUNTER — POSTPARTUM VISIT (OUTPATIENT)
Dept: OBGYN CLINIC | Age: 30
End: 2025-01-02

## 2025-01-02 VITALS
SYSTOLIC BLOOD PRESSURE: 120 MMHG | DIASTOLIC BLOOD PRESSURE: 80 MMHG | WEIGHT: 184 LBS | HEIGHT: 64 IN | BODY MASS INDEX: 31.41 KG/M2

## 2025-01-02 DIAGNOSIS — N63.10 MASS OF RIGHT BREAST, UNSPECIFIED QUADRANT: ICD-10-CM

## 2025-01-02 DIAGNOSIS — N60.11 MASTITIS CHRONIC, RIGHT: ICD-10-CM

## 2025-01-02 PROCEDURE — 0503F POSTPARTUM CARE VISIT: CPT | Performed by: NURSE PRACTITIONER

## 2025-01-02 RX ORDER — SULFAMETHOXAZOLE AND TRIMETHOPRIM 800; 160 MG/1; MG/1
1 TABLET ORAL 2 TIMES DAILY
Qty: 20 TABLET | Refills: 0 | Status: SHIPPED | OUTPATIENT
Start: 2025-01-02 | End: 2025-01-12

## 2025-01-02 ASSESSMENT — PATIENT HEALTH QUESTIONNAIRE - PHQ9: DEPRESSION UNABLE TO ASSESS: PT REFUSES

## 2025-01-02 NOTE — PATIENT INSTRUCTIONS
stop-smoking programs and medicines. These can increase your chances of quitting for good.  Breastfeeding your child may help prevent SIDS.  Be wary of products that are billed as helping prevent SIDS. Talk to your doctor before buying any product that claims to reduce SIDS risk.  What to do while still pregnant  See your doctor regularly. Women who see a doctor early in and throughout their pregnancies are less likely to have babies who die of SIDS.  Eat a healthy, balanced diet, which can help prevent a premature baby or a baby with a low birth weight.  Do not smoke or let anyone else smoke in the house or around you. Smoking or exposure to smoke during pregnancy increases the risk of SIDS. If you need help quitting, talk to your doctor about stop-smoking programs and medicines. These can increase your chances of quitting for good.  Do not drink alcohol or take illegal drugs. Alcohol or drug use may cause your baby to be born early.  Follow-up care is a key part of your child's treatment and safety. Be sure to make and go to all appointments, and call your doctor if your child is having problems. It's also a good idea to know your child's test results and keep a list of the medicines your child takes.  Where can you learn more?  Go to [email or web address] and sign in to your [employer] account. Enter E820 in the Search Health Information box to learn more about \"Learning About Safe Sleep for Babies.\"     If you do not have an account, please click on the \"Sign Up Now\" link.  Current as of: December 12, 2018  Content Version: 12.0  © 1017-4991 [employer]. Care instructions adapted under license by [employer]. If you have questions about a medical condition or this instruction, always ask your healthcare professional. [employer] disclaims any warranty or liability for your use of this information.

## 2025-01-02 NOTE — PROGRESS NOTES
Judgment normal.       /80 (Site: Left Upper Arm, Position: Sitting, Cuff Size: Medium Adult)   Ht 1.626 m (5' 4\")   Wt 83.5 kg (184 lb)   LMP 2024 (Exact Date)   Breastfeeding Yes   BMI 31.58 kg/m²     Assessment:     Normal Postpartum    Assessment & Plan    Diagnosis Orders   1.  23 F Apg 8/9 Wt 7#15  sulfamethoxazole-trimethoprim (BACTRIM DS) 800-160 MG per tablet    US BREAST COMPLETE RIGHT    AFL (Epic) - Laura Frank DO, General Surgery, [place]      2. 6 weeks postpartum follow-up  sulfamethoxazole-trimethoprim (BACTRIM DS) 800-160 MG per tablet    US BREAST COMPLETE RIGHT    AFL (Epic) - Laura Frank DO, General Surgery, [place]      3. Mastitis chronic, right  sulfamethoxazole-trimethoprim (BACTRIM DS) 800-160 MG per tablet    US BREAST COMPLETE RIGHT    AFL (Epic) - Laura Frank DO, General Surgery, [place]      4. Mass of right breast, unspecified quadrant  sulfamethoxazole-trimethoprim (BACTRIM DS) 800-160 MG per tablet    US BREAST COMPLETE RIGHT    AFL (Epic) - Laura Frank DO, General Surgery, [place]          Plan:   Cont restrictions through 6 weeks- no lifting over 15 lb, pelvic rest  DVT prevention reviewed  Endometritis signs reviewed  Mastitis concerns and concern for abscess reviewed with pt- right breast has +localized mass/swelling, erythema and tenderness she failed keflex will switch to bactrim   Inform her too while taking this it is very important to continue to nurse/pump from that breast.  She will need to give herself deep, painful, milking-type of massage to the ducts to get things moving.  Do this with warm compress and use warm compresses as much as able.      I have ordered breast US and placed urgent referral to breast surgeon Dr. Frank  I have informed her if worsening symptoms fevers, chills, red streaking increased redness/swelling needs to present to ER for management of possible breast abscess      PPD symptoms

## 2025-01-03 ASSESSMENT — ENCOUNTER SYMPTOMS
NAUSEA: 0
VOMITING: 0
COLOR CHANGE: 1
DIARRHEA: 0
COUGH: 0
SHORTNESS OF BREATH: 0
CONSTIPATION: 0

## 2025-01-06 ENCOUNTER — OFFICE VISIT (OUTPATIENT)
Dept: OBGYN CLINIC | Age: 30
End: 2025-01-06

## 2025-01-06 VITALS — WEIGHT: 182 LBS | BODY MASS INDEX: 31.24 KG/M2 | DIASTOLIC BLOOD PRESSURE: 62 MMHG | SYSTOLIC BLOOD PRESSURE: 118 MMHG

## 2025-01-06 DIAGNOSIS — O91.119: Primary | ICD-10-CM

## 2025-01-06 DIAGNOSIS — N61.0 MASTITIS: ICD-10-CM

## 2025-01-09 ENCOUNTER — OFFICE VISIT (OUTPATIENT)
Dept: OBGYN CLINIC | Age: 30
End: 2025-01-09
Payer: COMMERCIAL

## 2025-01-09 VITALS
HEIGHT: 64 IN | BODY MASS INDEX: 31.14 KG/M2 | DIASTOLIC BLOOD PRESSURE: 72 MMHG | WEIGHT: 182.38 LBS | SYSTOLIC BLOOD PRESSURE: 112 MMHG

## 2025-01-09 DIAGNOSIS — N63.10 MASS OF RIGHT BREAST, UNSPECIFIED QUADRANT: ICD-10-CM

## 2025-01-09 DIAGNOSIS — N60.11 MASTITIS CHRONIC, RIGHT: ICD-10-CM

## 2025-01-09 DIAGNOSIS — O91.13 ABSCESS OF RIGHT BREAST ASSOCIATED WITH LACTATION: Primary | ICD-10-CM

## 2025-01-09 PROBLEM — Z3A.39 39 WEEKS GESTATION OF PREGNANCY: Status: RESOLVED | Noted: 2024-11-05 | Resolved: 2025-01-09

## 2025-01-09 PROBLEM — Z3A.40 40 WEEKS GESTATION OF PREGNANCY: Status: RESOLVED | Noted: 2024-11-12 | Resolved: 2025-01-09

## 2025-01-09 PROCEDURE — 99212 OFFICE O/P EST SF 10 MIN: CPT | Performed by: STUDENT IN AN ORGANIZED HEALTH CARE EDUCATION/TRAINING PROGRAM

## 2025-01-09 RX ORDER — SULFAMETHOXAZOLE AND TRIMETHOPRIM 800; 160 MG/1; MG/1
1 TABLET ORAL 2 TIMES DAILY
Qty: 10 TABLET | Refills: 0 | Status: SHIPPED | OUTPATIENT
Start: 2025-01-09 | End: 2025-01-14

## 2025-01-09 NOTE — PROGRESS NOTES
OB/GYN Follow up Visit    Mildred Parekh  2026                       Primary Care Physician: Avelina Bonilla APRN - CNP    CC:   Chief Complaint   Patient presents with    Postpartum Care         HPI: Mildred Parekh is a 29 y.o. female   Patient's last menstrual period was 2024 (exact date).    The patient was seen and examined.   -Follow up for right breast abscess. Continuing to improve. See media for photos.   -Extend bactrim      REVIEW OF SYSTEMS:   -Constitutional: (-) fever, (-) chills  -HEENT: (-) visual disturbances, (-)headaches  -Respiratory: (-) dyspnea, (-) cough  -Cardiovascular: (-) chest pain, (-) palpitations  -Gastrointestinal: (-) abdominal pain, (-) N/V, (-) diarrhea, (-) constipation  -Genitourinary: (-) vaginal discharge  -Hematologic: (-) bruising  -Immunologic/Lymphatic: (-) recent illness, (-) recent sick contact    ________________________________________________________________________      OBSTETRICAL HISTORY:  OB History    Para Term  AB Living   1 1 1     1   SAB IAB Ectopic Molar Multiple Live Births           0 1      # Outcome Date GA Lbr Inderjit/2nd Weight Sex Type Anes PTL Lv   1 Term 24 40w6d 01:19 / 03:45 3.62 kg (7 lb 15.7 oz) F Vag-Spont EPI N GILMAR       PAST MEDICAL HISTORY:      Diagnosis Date    Anemia        PAST SURGICAL HISTORY:                                                                    Procedure Laterality Date    WISDOM TOOTH EXTRACTION         MEDICATIONS:  Current Outpatient Medications   Medication Sig Dispense Refill    sulfamethoxazole-trimethoprim (BACTRIM DS) 800-160 MG per tablet Take 1 tablet by mouth 2 times daily for 5 days Barrier contraception is recommended. 10 tablet 0    acetaminophen (TYLENOL) 500 MG tablet Take 2 tablets by mouth every 6 hours as needed for Pain 120 tablet 1    ibuprofen (ADVIL;MOTRIN) 600 MG tablet Take 1 tablet by mouth every 6 hours as needed for Pain 90 each 1    calcium carbonate (TUMS) 500 MG

## 2025-04-01 ENCOUNTER — HOSPITAL ENCOUNTER (OUTPATIENT)
Age: 30
Setting detail: SPECIMEN
Discharge: HOME | End: 2025-04-01

## 2025-04-01 ENCOUNTER — OFFICE VISIT (OUTPATIENT)
Dept: OBGYN CLINIC | Age: 30
End: 2025-04-01
Payer: COMMERCIAL

## 2025-04-01 VITALS — WEIGHT: 183 LBS | BODY MASS INDEX: 31.24 KG/M2 | HEIGHT: 64 IN

## 2025-04-01 DIAGNOSIS — Z01.419 WELL WOMAN EXAM: Primary | ICD-10-CM

## 2025-04-01 PROCEDURE — 99395 PREV VISIT EST AGE 18-39: CPT | Performed by: ADVANCED PRACTICE MIDWIFE

## 2025-04-01 ASSESSMENT — PATIENT HEALTH QUESTIONNAIRE - PHQ9
SUM OF ALL RESPONSES TO PHQ QUESTIONS 1-9: 0
1. LITTLE INTEREST OR PLEASURE IN DOING THINGS: NOT AT ALL
2. FEELING DOWN, DEPRESSED OR HOPELESS: NOT AT ALL
SUM OF ALL RESPONSES TO PHQ QUESTIONS 1-9: 0

## 2025-04-01 ASSESSMENT — ENCOUNTER SYMPTOMS
DIARRHEA: 0
NAUSEA: 0
SHORTNESS OF BREATH: 0
ABDOMINAL PAIN: 0
VOMITING: 0

## 2025-04-01 NOTE — PROGRESS NOTES
[facility], [facility] [address]  [address]  [facility] [address]  Dept: [phone]    Patient Name: Mildred Parekh  Patient Age: 29 y.o.  Date of Visit: 2025    Subjective  Chief Complaint   Patient presents with    Annual Exam     Patient's last menstrual period was 2025.    Chaperone for Intimate Exam  Chaperone was offered as part of the rooming process. Patient declined and agrees to continue with exam without a chaperone.  Chaperone: n/a    HPI  Patient arrives for annual exam as a established patient to the practice, and established to me.  Mildred Parekh does not admit to any concerns today.     Reported previous gyn history:  Menstrual cycles began at the age of 10  Mildred Parekh reports having monthly menstrual cycles. Just had first period since delivery. Still breastfeeding without issues     Patient reports is  sexually active with 1 male partner(s).   Patient denies  pain with sex.  Reports is  protecting against a pregnancy. Current method(s) including using condoms 80% of the time  Patient is using a barrier method with sexual encounters   Patient denies a history of sexually transmitted infection(s)   If yes, including none  Patient does not want screening for sexually transmitted infection(s).   Mildred Parekh requesting testing for n/a    Reported previous OB history:   Mildred Parekh     Pertinent Chronic Health Conditions/Medications: No          2025     2:42 PM 2024     2:08 PM   PHQ Scores   PHQ2 Score 0 1   PHQ9 Score 0 1     Interpretation of Total Score Depression Severity: 1-4 = Minimal depression, 5-9 = Mild depression, 10-14 = Moderate depression, 15-19 = Moderately severe depression, 20-27 = Severe depression      2024     2:29 PM 2024     3:31 PM   TERENCE 7 SCORE   TERENCE-7 Total Score 9 5     Interpretation of TERENCE-7 score: 5-9 = mild anxiety, 10-14 = moderate anxiety, 15+ = severe anxiety. Recommend referral

## 2025-04-02 LAB
HPV I/H RISK 4 DNA CVX QL NAA+PROBE: NOT DETECTED
HPV SAMPLE: NORMAL
HPV, INTERPRETATION: NORMAL
HPV16 DNA CVX QL NAA+PROBE: NOT DETECTED
HPV18 DNA CVX QL NAA+PROBE: NOT DETECTED
SPECIMEN DESCRIPTION: NORMAL

## 2025-04-08 ENCOUNTER — RESULTS FOLLOW-UP (OUTPATIENT)
Dept: OBGYN CLINIC | Age: 30
End: 2025-04-08

## 2025-04-08 LAB — CYTOLOGY REPORT: NORMAL
